# Patient Record
Sex: FEMALE | Race: WHITE | Employment: FULL TIME | URBAN - METROPOLITAN AREA
[De-identification: names, ages, dates, MRNs, and addresses within clinical notes are randomized per-mention and may not be internally consistent; named-entity substitution may affect disease eponyms.]

---

## 2017-12-15 ENCOUNTER — TELEPHONE (OUTPATIENT)
Dept: OBGYN | Facility: OTHER | Age: 22
End: 2017-12-15

## 2017-12-15 NOTE — TELEPHONE ENCOUNTER
12/15/2017    Call Regarding ReattributionPhysical    Attempt 1    Message on voicemail    Comments:       Outreach   Maria Elena Wharton

## 2018-02-22 NOTE — TELEPHONE ENCOUNTER
2/22/2018    Call Regarding Reattribution Physical     Attempt 2    Message on voicemail     Comments:       Outreach   Rosanna Ledesma

## 2018-03-09 NOTE — TELEPHONE ENCOUNTER
3/8/2018    Call Regarding ReattributionPhysical       Attempt 3    Message on voicemail     Comments:       Outreach   SV

## 2018-04-20 ENCOUNTER — HEALTH MAINTENANCE LETTER (OUTPATIENT)
Age: 23
End: 2018-04-20

## 2020-09-08 ENCOUNTER — OFFICE VISIT (OUTPATIENT)
Dept: FAMILY MEDICINE | Facility: CLINIC | Age: 25
End: 2020-09-08
Payer: COMMERCIAL

## 2020-09-08 VITALS
SYSTOLIC BLOOD PRESSURE: 118 MMHG | WEIGHT: 231.44 LBS | RESPIRATION RATE: 16 BRPM | TEMPERATURE: 97.9 F | DIASTOLIC BLOOD PRESSURE: 80 MMHG | HEART RATE: 97 BPM | BODY MASS INDEX: 33.69 KG/M2

## 2020-09-08 DIAGNOSIS — A60.04 HERPES SIMPLEX VIRUS (HSV) INFECTION OF VAGINA: Primary | ICD-10-CM

## 2020-09-08 DIAGNOSIS — Z11.3 SCREEN FOR STD (SEXUALLY TRANSMITTED DISEASE): ICD-10-CM

## 2020-09-08 PROCEDURE — 87389 HIV-1 AG W/HIV-1&-2 AB AG IA: CPT | Performed by: NURSE PRACTITIONER

## 2020-09-08 PROCEDURE — 87591 N.GONORRHOEAE DNA AMP PROB: CPT | Performed by: NURSE PRACTITIONER

## 2020-09-08 PROCEDURE — 99203 OFFICE O/P NEW LOW 30 MIN: CPT | Performed by: NURSE PRACTITIONER

## 2020-09-08 PROCEDURE — 87491 CHLMYD TRACH DNA AMP PROBE: CPT | Performed by: NURSE PRACTITIONER

## 2020-09-08 PROCEDURE — 86695 HERPES SIMPLEX TYPE 1 TEST: CPT | Performed by: NURSE PRACTITIONER

## 2020-09-08 PROCEDURE — 86696 HERPES SIMPLEX TYPE 2 TEST: CPT | Performed by: NURSE PRACTITIONER

## 2020-09-08 PROCEDURE — 36415 COLL VENOUS BLD VENIPUNCTURE: CPT | Performed by: NURSE PRACTITIONER

## 2020-09-08 RX ORDER — VALACYCLOVIR HYDROCHLORIDE 1 G/1
1000 TABLET, FILM COATED ORAL 2 TIMES DAILY
Qty: 20 TABLET | Refills: 0 | Status: SHIPPED | OUTPATIENT
Start: 2020-09-08 | End: 2020-09-22

## 2020-09-08 ASSESSMENT — PAIN SCALES - GENERAL: PAINLEVEL: NO PAIN (1)

## 2020-09-08 NOTE — PATIENT INSTRUCTIONS
Patient Education     What Are Sexually Transmitted Diseases (STDs)?  A sexually transmitted disease (STD) is a disease that is spread during sex. (An STD can also be called STI for sexually transmitted infection.) You can become infected with an STD if you have sex with someone who has an STD. Any sex that involves the penis, vagina, anus, or mouth can spread disease. Some STDs spread through body fluids such as semen, vaginal fluid, or blood. Others spread through contact with affected skin.  Who is at risk?     Places on or in the body where STDs cause damage include reproductive organs, the rectum, and the mouth.   It doesn t matter if you re straight or peña, male or female, young or old. Any person who has sex can get an STD. Your risk increases if:    You have more than one partner. The more partners you have, the greater your risk.    Your partner has other partners. If your partner is exposed to an STD, you could be, too.    You or your partner have had sex with other people in the past. Either of you might be carrying an STD from an earlier partner.    You have an STD. The STD may cause sores or other health problems that increase your risk of new infections. Your risk will stay high unless you change the behaviors that put you at risk of the current infection.  Prevent future problems  Left untreated, certain STDs can lead to cancer or even death. Some can harm unborn babies whose mothers are infected. Others can cause you to not be able to have children (sterility) or can affect changes in behavior or your ability to think. You can prevent these problems with safer sex, regular checkups, and early treatment. Always use a latex condom when you have sex. Get tested if you re at risk. And get treated early if you have an STD.  Getting checked  The only sure way to know if you have an STD is to get checked by a healthcare provider. If you notice a change in how your body looks or feels, have it checked out.  But keep in mind, STDs don t always show symptoms. So if you re at risk of STDs, get checked regularly. If you find you have an STD, be sure your partner gets treatment, too. If not, his or her health is at risk. And left untreated, your partner could pass the STD back to you, or on to others.  Common symptoms  Be alert to any changes in your body and your partner s body. Symptoms may appear in or near the vagina, penis, rectum, mouth, or throat. They include:    Unusual discharge    Lumps, bumps, or rashes    Sores that may be painful, itchy, or painless    Itchy skin    Burning with urination    Pain in the pelvis, belly (abdomen), or rectum  Even if you don t have symptoms  You may have an STD, even if you don t have symptoms. If you think you are at risk, get checked. Go to a clinic or to your healthcare provider. If your partner has an STD, you need to be tested too, even if you feel fine.  Vaccines to prevent disease  Vaccines (also called immunizations) are available to prevent hepatitis A and hepatitis B. These are two kinds of STDs. There is also a vaccine to prevent HPV. This is a virus that can be passed from person to person through sexual contact. Ask your healthcare provider whether any of these vaccines is right for you.   Date Last Reviewed: 11/1/2016 2000-2019 [employer]. [address] [place], [place] [id number]. All rights reserved. This information is not intended as a substitute for professional medical care. Always follow your healthcare professional's instructions.           Patient Education     Living with Herpes  To speed healing, take care of open herpes sores. To reduce outbreaks, take care of your health. And to keep from infecting others, learn how to avoid spreading the virus.     To ease symptoms    Start episodic treatment at the first sign of symptoms, such as itching or tingling.    Take ibuprofen or acetaminophen to limit any pain.    Sit in a warm or cool bath  or use a moist compress to lessen the itching of sores. For some women, genital outbreaks cause burning during urination. In such cases, urinating in a tub of warm water helps reduce burning.    Wear white cotton underwear and loose clothing during outbreaks. Don t wear nylon underwear or tight clothes. They can prevent sores from healing.       To speed healing    Wash sores with mild soap and water. Pat (don't rub) the sores completely dry.    Always wash your hands after touching a sore.    Don t bandage sores. Air helps them heal.    Avoid using any ointment unless it is prescribed. Applying the wrong jelly or cream may hold in moisture and slow healing.    Don t pick at the sores. This can slow healing, and might cause a sore to become infected.    If you wear contacts, wash your hands well before putting them in.       To reduce outbreaks    Eat a balanced diet. Your health care provider may suggest taking supplements. These help ensure that you get all the nutrients you need.    Get plenty of sleep. This helps your immune system work its best.    Limit stress and tension. Both can weaken the body s defenses.    Limit exposure to sun, wind, and extreme heat or cold. Wear sunscreen and lip balm to help prevent outbreaks.       To protect others    Tell your current sex partner and any future partners that you have herpes. If you don t know what to say, ask your healthcare provider for help.    Use a latex condom that covers the affected areas each time you have sex. This reduces the risk of passing herpes to your partner.    Avoid kissing when you have an oral sore.    Do not have intercourse when genital sores are present. Also keep in mind, herpes can be passed during oral sex and with anal contact.    Don t share towels, toothbrushes, lip balm, or lipstick when you have a sore.    If you have very frequent outbreaks, taking daily antiviral medicines can help reduce the likelihood of transmission to your  partner.    Date Last Reviewed: 1/1/2017 2000-2019 [employer]. [address] [place] [address]. All rights reserved. This information is not intended as a substitute for professional medical care. Always follow your healthcare professional's instructions.           Patient Education     The Herpes Virus  Herpes is a virus that can cause sores on the skin. There are 2 types of the virus. Depending on how you come in contact with the virus, either type can cause outbreaks near the mouth or on the sex organs.  Understanding the herpes virus  Herpes reproduces only when it is inside the body. It does so by tricking a healthy cell into producing copies of the herpes virus. Each copy can infect nearby cells. But, before too long, the body s defenses rally to stop the attack. The immune system forces the virus to retreat. Even then, the virus stays inside the body but does not cause disease. For some people, an acute outbreak never happens again. For others, outbreaks are more likely to occur due to menstruation, illness, poor diet, fatigue, exposure to cold or strong sunlight, or stress.    How the herpes virus attacks  1. The herpes virus enters the body through a small break in the skin. The virus can also enter by direct contact with mucous membranes, such as those of the lips, vagina, or anus.  2. Inside the body, the herpes virus binds to a special site on a skin cell. Then part of the virus moves into the cell.  3. Inside the skin cell, the virus releases a set of instructions. These commands cause the cell to begin making copies of the herpes virus.  4. Herpes blisters appear on the skin. Herpes blisters may also appear on mucous membranes lining the mouth, vagina, or anus.    Date Last Reviewed: 1/1/2017 2000-2019 [employer]. [address] [place] [address]. All rights reserved. This information is not intended as a substitute for professional medical care.  Always follow your healthcare professional's instructions.           Patient Education     Herpes: Treatment  Medicines can t cure herpes. But they can help you feel better, and reduce the chances of passing herpes to others. Herpes medicines can control symptoms and shorten the duration of an outbreak (episodic therapy). Some herpes medicines can reduce the number of outbreaks (suppressive therapy). Your healthcare provider will explain your options and any possible side effects.    How the medicines work  Antiviral medicines can prevent the herpes virus from copying itself and help reduce spreading it. Results may vary between people but they are generally helpful if given at the right times and used as directed. Take each medicine exactly as prescribed. Options include:    Primary treatment for the first outbreak. Medicine may be taken for up to 14 days. If needed, it may be taken longer.    Episodic therapy, for infrequent outbreaks. You take medicine for 5-7 days each time you notice symptoms. This can reduce your symptoms and the length of the outbreak. It is important to start the medicine as soon as symptoms of a new outbreak appear.    Suppressive therapy, for frequent outbreaks. This daily medicine can reduce the number of outbreaks you have. In some cases, suppressive therapy prevents all outbreaks and greatly reduces the risk of giving the virus to others.  Types of medicines  There are several types of herpes medicines. Your options depend on how often you have symptoms and how severe they are.    Oral medicines come in pill form. These medicines are most commonly used to treat genital herpes.    Topical medicines come in ointment form. These can be used during outbreaks of oral herpes.    Intravenous (IV) medicines are sometimes used to treat severe herpes in infants, the elderly, or people with weak immune systems.  Date Last Reviewed: 1/1/2017 2000-2019 [employer]. [address] [place] [address]  Road, [place] [address]. All rights reserved. This information is not intended as a substitute for professional medical care. Always follow your healthcare professional's instructions.    Take the antiviral medication for the next 10 days.   Use barrier cream: Desitin over the sores 2 times daily for pain. Take this off with baby oil.   We will have you go to lab today for full STD screen. I will call you with results.

## 2020-09-08 NOTE — PROGRESS NOTES
Subjective     Edgar Rowell is a 25 year old female who presents to clinic today for the following health issues:    Patient had unprotected sex with a new partner about 2 weeks ago. She noticed tenderness and pain in the area of her rectum, perineum, and clitoris over the last couple days, she also notices some itching. She has seen some small blister like lesions but can not see around the rectum. No other acute symptoms. She has recently moved home to care for her mother who is ill, she is under a great deal of stress and is very teary, and feels embarrassed about possibility of STD.     HPI     Patient is also having some white vaginal discharge, pain when touched.   Rash  Onset/Duration: Sat  Description  Location: Butt, vaginal area  Character: Unknown  Itching: severe  Intensity:  severe  Progression of Symptoms:  worsening  Accompanying signs and symptoms:   Fever: no  Body aches or joint pain: no  Sore throat symptoms: no  Recent cold symptoms: tonsil were swollen a few days ago.   History:           Previous episodes of similar rash: None  New exposures:  None and soaps  New body wash   Recent travel: no  Exposure to similar rash: no  Precipitating or alleviating factors: None  Therapies tried and outcome: Neosporin did not help.         Review of Systems   Constitutional, HEENT, cardiovascular, pulmonary, gi and gu systems are negative, except as otherwise noted.      Objective    /80   Pulse 97   Temp 97.9  F (36.6  C) (Temporal)   Resp 16   Wt 105 kg (231 lb 7 oz)   Breastfeeding No   BMI 33.69 kg/m    Body mass index is 33.69 kg/m .  Physical Exam   GENERAL: alert and mild distress  NECK: no asymmetry, masses, or scars  RESP: no wheezes and normal effort.   ABDOMEN: soft, nontender   (female): pustules over the rectum, perineum, and labia, some are unroofed with yellow base. Very painful to exam.   MS: no gross musculoskeletal defects noted, no edema  PSYCH: mentation appears normal,  tearful and anxious            Assessment & Plan     Herpes simplex virus (HSV) infection of vagina  - Herpes lesions over perineum, rectum, and labia. Will get labs to confirm. Will start Valtrex now. I will follow up with her in 2 weeks to assess how she responded to treatment and to do her physical.   - valACYclovir (VALTREX) 1000 mg tablet; Take 1 tablet (1,000 mg) by mouth 2 times daily for 10 days  - Herpes Simplex Virus 1 and 2 IgG    Screen for STD (sexually transmitted disease)  - Will to complete STD screen. Patient verbalized understanding and is in agreement.   - HIV Antigen Antibody Combo  - Herpes Simplex Virus 1 and 2 IgG  - NEISSERIA GONORRHOEA PCR  - CHLAMYDIA TRACHOMATIS PCR    - Patient instructed to call clinic if symptoms get worse over the next week.     - Home care instructions and informational hand outs provided for managing and living with Herpes.     Return in about 2 weeks (around 9/22/2020) for Physical Exam.    Brian Londono NP  [facility]

## 2020-09-09 LAB
C TRACH DNA SPEC QL NAA+PROBE: NEGATIVE
HIV 1+2 AB+HIV1 P24 AG SERPL QL IA: NONREACTIVE
HSV1 IGG SERPL QL IA: 0.3 AI (ref 0–0.8)
HSV2 IGG SERPL QL IA: <0.2 AI (ref 0–0.8)
N GONORRHOEA DNA SPEC QL NAA+PROBE: NEGATIVE
SPECIMEN SOURCE: NORMAL
SPECIMEN SOURCE: NORMAL

## 2020-09-22 ENCOUNTER — OFFICE VISIT (OUTPATIENT)
Dept: FAMILY MEDICINE | Facility: CLINIC | Age: 25
End: 2020-09-22
Payer: COMMERCIAL

## 2020-09-22 VITALS
SYSTOLIC BLOOD PRESSURE: 100 MMHG | DIASTOLIC BLOOD PRESSURE: 64 MMHG | HEIGHT: 70 IN | TEMPERATURE: 98.7 F | BODY MASS INDEX: 32.03 KG/M2 | WEIGHT: 223.7 LBS | RESPIRATION RATE: 16 BRPM

## 2020-09-22 DIAGNOSIS — F41.1 ANXIETY REACTION: ICD-10-CM

## 2020-09-22 DIAGNOSIS — Z12.4 SCREENING FOR CERVICAL CANCER: ICD-10-CM

## 2020-09-22 DIAGNOSIS — A60.04 HERPES SIMPLEX VIRUS (HSV) INFECTION OF VAGINA: Primary | ICD-10-CM

## 2020-09-22 PROCEDURE — 90715 TDAP VACCINE 7 YRS/> IM: CPT | Performed by: NURSE PRACTITIONER

## 2020-09-22 PROCEDURE — G0145 SCR C/V CYTO,THINLAYER,RESCR: HCPCS | Performed by: NURSE PRACTITIONER

## 2020-09-22 PROCEDURE — 90471 IMMUNIZATION ADMIN: CPT | Performed by: NURSE PRACTITIONER

## 2020-09-22 PROCEDURE — 99395 PREV VISIT EST AGE 18-39: CPT | Mod: 25 | Performed by: NURSE PRACTITIONER

## 2020-09-22 RX ORDER — VALACYCLOVIR HYDROCHLORIDE 500 MG/1
500 TABLET, FILM COATED ORAL 2 TIMES DAILY
Qty: 6 TABLET | Refills: 6 | Status: SHIPPED | OUTPATIENT
Start: 2020-09-22 | End: 2021-05-07

## 2020-09-22 ASSESSMENT — MIFFLIN-ST. JEOR: SCORE: 1846.2

## 2020-09-22 NOTE — NURSING NOTE
Prior to immunization administration, verified patients identity using patient s name and date of birth. Please see Immunization Activity for additional information.     Screening Questionnaire for Pediatric Immunization    Is the child sick today?   No   Does the child have allergies to medications, food, a vaccine component, or latex?   No   Has the child had a serious reaction to a vaccine in the past?   No   Does the child have a long-term health problem with lung, heart, kidney or metabolic disease (e.g., diabetes), asthma, a blood disorder, no spleen, complement component deficiency, a cochlear implant, or a spinal fluid leak?  Is he/she on long-term aspirin therapy?   No   If the child to be vaccinated is 2 through 4 years of age, has a healthcare provider told you that the child had wheezing or asthma in the  past 12 months?   No   If your child is a baby, have you ever been told he or she has had intussusception?   No   Has the child, sibling or parent had a seizure, has the child had brain or other nervous system problems?   No   Does the child have cancer, leukemia, AIDS, or any immune system         problem?   No   Does the child have a parent, brother, or sister with an immune system problem?   No   In the past 3 months, has the child taken medications that affect the immune system such as prednisone, other steroids, or anticancer drugs; drugs for the treatment of rheumatoid arthritis, Crohn s disease, or psoriasis; or had radiation treatments?   No   In the past year, has the child received a transfusion of blood or blood products, or been given immune (gamma) globulin or an antiviral drug?   No   Is the child/teen pregnant or is there a chance that she could become       pregnant during the next month?   No   Has the child received any vaccinations in the past 4 weeks?   No      Immunization questionnaire answers were all negative.        [place] given to patient.    Per  orders of Brian Londono CNP, injection of Tdap given by Yumiko Montano CMA. Patient instructed to remain in clinic for 15 minutes afterwards, and to report any adverse reaction to me immediately.    Screening performed by Yumiko Montano CMA on 9/22/2020 at 3:44 PM.

## 2020-09-22 NOTE — PATIENT INSTRUCTIONS
Valtrex refills are in pick one up today.     You will be called with the PAP results.     Tetanus today.    Flu shot in October so you don't get mom sick.     Referral to Behavioral Health: Crystal Alvarenga will call you.     Call me if you need anything.     Brian Londono CNP   Patient Education     Genital Herpes    Genital herpes is a common sexually transmitted infection (STI). It is caused by the herpes simplex virus (HSV). One out of 5 teens and adults carry the herpes virus. During an outbreak, it causes small blisters that break open, leaving small, painful sores in the genital area. Eventually, scabs form and the sores heal. In women, these show up most often on the skin just outside the vaginal opening. They can occur on the buttocks, anus, or cervix. In men, the sores are usually on the tip, sides, or base of the penis. They also occur on the scrotum, buttocks, or thighs.  The first outbreak begins within 2 to 3 weeks after exposure to an infected sexual partner. It may last 1 to 3 weeks. It may cause headache, muscle ache, and fevers. The first outbreak is usually the worst. Because the virus remains in the body even after the sores heal, most people will have more outbreaks. The frequency of outbreaks is different for each person. Some people will never have another outbreak. Others will have several episodes a year. Later outbreaks are usually shorter, milder, and less painful. For many, the number of outbreaks tends to decrease over time. Various factors may trigger an outbreak. These include:    Emotional stress    Menstruation    Presence of another illness (cold, flu, or fever from any cause)    Overexertion and fatigue    Weak immune system  Home care    It is very important that you do not have sexual relations until all the herpes sores have healed completely.    Wash the affected area gently with mild soap and water. Wash your hands after touching the affected area.    You may use  over-the-counter pain medicine unless another pain medicine was prescribed. If you have chronic liver or kidney disease or have ever had a stomach ulcer or gastrointestinal bleeding, talk with your healthcare provider before using these medicines. Also talk to your provider if you are taking medicine to prevent blood clots. Aspirin should never be given to anyone younger than 18 years of age who is ill with a viral infection or fever. It may cause severe liver or brain damage.    Your healthcare provider may prescribe antiviral medicine during the first outbreak. This will help the sores heal faster. Antiviral medicine may also be prescribed so that you have it ready to take at the first sign of another outbreak. This will help the symptoms go away sooner. For people with frequent outbreaks, daily preventive therapy may be prescribed. This will help reduce the frequency of attacks. Daily preventive therapy may also reduce risk of spread of herpes to your sexual partner. Discuss the risks and benefits of daily therapy with your healthcare provider.    If you are a woman who is pregnant now or may become pregnant in the future, let your healthcare provider know that you have had herpes. This may affect the way your baby is delivered.  Preventing spread to others  The virus is spread by sexual contact with someone who has the herpes virus. The risk of spread is highest when the sores are present. However, there is a chance of spreading the virus even when sores are not visible. Inform future sexual partners that you have herpes and that they may become infected. To reduce the risk of passing the virus to a partner who has never had herpes, avoid sexual relations at the first sign of an outbreak and until the sores are fully healed. Latex barriers, such as condoms, reduce the risk of spread between outbreaks if the infected site is covered, but they do not guarantee protection.  Follow-up care  Follow up with your  healthcare provider, or as advised.  People who have just learned that they have herpes may feel upset. Getting the facts about herpes can help you feel more in control. Follow up with your healthcare provider or the public health department for complete STI screening, including HIV testing.  When to seek medical advice  Call your healthcare provider right away if any of these occur:    Inability to urinate due to pain    Swelling or increasing redness in the genital area    Discharge from the vagina or penis    Increasing back or abdominal pain    Rash or joint pain  Call 911  Call 911 or get immediate medical care if any of these occur:    Unusual drowsiness, weakness, or confusion    Worsening headache or stiff neck  Date Last Reviewed: 6/1/2018 2000-2019 The [employer] [address] [place] [address], [place] [address]. All rights reserved. This information is not intended as a substitute for professional medical care. Always follow your healthcare professional's instructions.

## 2020-09-22 NOTE — PROGRESS NOTES
SUBJECTIVE:   CC: Edgar Rowell is an 25 year old woman who presents for preventive health visit.       Patient has been advised of split billing requirements and indicates understanding: Yes  Healthy Habits:    Getting at least 3 servings of Calcium per day:  Yes    Bi-annual eye exam:  NO    Dental care twice a year:  NO    Sleep apnea or symptoms of sleep apnea:  None    Diet:  Regular (no restrictions)    Frequency of exercise:  None    Duration of exercise:  Less than 15 minutes    Taking medications regularly:  Not Applicable    Barriers to taking medications:  Not applicable    Medication side effects:  Not applicable    PHQ-2 Total Score:    Additional concerns today:  No              Today's PHQ-2 Score:   PHQ-2 ( 1999 Pfizer) 9/8/2020   Q1: Little interest or pleasure in doing things 0   Q2: Feeling down, depressed or hopeless 0   PHQ-2 Score 0       Abuse: Current or Past (Physical, Sexual or Emotional) - No  Do you feel safe in your environment? Yes        Social History     Tobacco Use     Smoking status: Never Smoker     Smokeless tobacco: Never Used     Tobacco comment: vaping   Substance Use Topics     Alcohol use: Yes     Comment: social     If you drink alcohol do you typically have >3 drinks per day or >7 drinks per week? No    Alcohol Use 8/13/2015   Prescreen: >3 drinks/day or >7 drinks/week? The patient does not drink >3 drinks per day nor >7 drinks per week.       Reviewed orders with patient.  Reviewed health maintenance and updated orders accordingly - Yes  Labs reviewed in EPIC  BP Readings from Last 3 Encounters:   09/22/20 100/64   09/08/20 118/80   08/13/15 112/62    Wt Readings from Last 3 Encounters:   09/22/20 101.5 kg (223 lb 11.2 oz)   09/08/20 105 kg (231 lb 7 oz)   08/13/15 74.3 kg (163 lb 12 oz) (91 %, Z= 1.33)*     * Growth percentiles are based on CDC (Girls, 2-20 Years) data.                  Patient Active Problem List   Diagnosis     Patellofemoral syndrome, right      Dysmenorrhea     IUD (intrauterine device) in place     Past Surgical History:   Procedure Laterality Date     C NONSPECIFIC PROCEDURE  6/14/2004    Closed reduction with percutaneous pinning, left elbow.       Social History     Tobacco Use     Smoking status: Never Smoker     Smokeless tobacco: Never Used     Tobacco comment: vaping   Substance Use Topics     Alcohol use: Yes     Comment: social     Family History   Problem Relation Age of Onset     Heart Disease Father         heart attack at age 42     Heart Disease Other         MI         Current Outpatient Medications   Medication Sig Dispense Refill     valACYclovir (VALTREX) 500 MG tablet Take 1 tablet (500 mg) by mouth 2 times daily for 3 days 6 tablet 6     levonorgestrel (MIRENA) 20 MCG/24HR IUD 1 each (20 mcg) by Intrauterine route once for 1 dose 1 each 0     Allergies   Allergen Reactions     No Known Drug Allergies        Mammogram not appropriate for this patient based on age.    Pertinent mammograms are reviewed under the imaging tab.  History of abnormal Pap smear: NO - age 21-29 PAP every 3 years recommended     Reviewed and updated as needed this visit by clinical staff  Tobacco  Allergies  Meds  Med Hx  Surg Hx  Fam Hx  Soc Hx        Reviewed and updated as needed this visit by Provider    She is doing better. Herpes has resolved. No other new acute symptoms of concern. She does want to get some help with coping due to mothers terminal illness. She feels anxious and overwhelmed. She does not want medication at this time. Would rather talk with someone. She is not sexually active at this time but wanted information on when safe to be in an intimate relationship.             Review of Systems  CONSTITUTIONAL: NEGATIVE for fever, chills, change in weight  INTEGUMENTARU/SKIN: NEGATIVE for worrisome rashes, moles or lesions  EYES: NEGATIVE for vision changes or irritation  ENT: NEGATIVE for ear, mouth and throat problems  RESP: NEGATIVE for  "significant cough or SOB  BREAST: NEGATIVE for masses, tenderness or discharge  CV: NEGATIVE for chest pain, palpitations or peripheral edema  GI: NEGATIVE for nausea, abdominal pain, heartburn, or change in bowel habits  : NEGATIVE for unusual urinary or vaginal symptoms. Periods are regular.  MUSCULOSKELETAL: NEGATIVE for significant arthralgias or myalgia  NEURO: NEGATIVE for weakness, dizziness or paresthesias  PSYCHIATRIC: Stress and anxiety related to mothers terminal illness with ALS.      OBJECTIVE:   Ht 1.772 m (5' 9.76\")   Wt 101.5 kg (223 lb 11.2 oz)   BMI 32.32 kg/m    Physical Exam  GENERAL: healthy, alert and no distress  EYES: Eyes grossly normal to inspection, PERRL and conjunctivae and sclerae normal  HENT: ear canals and TM's normal, nose and mouth without ulcers or lesions  NECK: no adenopathy, no asymmetry, masses, or scars and thyroid normal to palpation  RESP: lungs clear to auscultation - no rales, rhonchi or wheezes  BREAST: normal without masses, tenderness or nipple discharge and no palpable axillary masses or adenopathy  CV: regular rate and rhythm, normal S1 S2, no S3 or S4, no murmur, click or rub, no peripheral edema and peripheral pulses strong  ABDOMEN: soft, nontender, no hepatosplenomegaly, no masses and bowel sounds normal   (female): normal female external genitalia, normal urethral meatus, vaginal mucosa pink, moist, well rugated, and normal cervix/adnexa/uterus without masses or discharge  MS: no gross musculoskeletal defects noted, no edema  SKIN: no suspicious lesions or rashes  NEURO: Normal strength and tone, mentation intact and speech normal  PSYCH: mentation appears normal, affect normal/bright  LYMPH: no cervical, supraclavicular, axillary, or inguinal adenopathy    Diagnostic Test Results:  Labs reviewed in Epic    ASSESSMENT/PLAN:   1. Herpes simplex virus (HSV) infection of vagina  - Valtrex for recurrent infection. Last infection has totally cleared.   - " "valACYclovir (VALTREX) 500 MG tablet; Take 1 tablet (500 mg) by mouth 2 times daily for 3 days  Dispense: 6 tablet; Refill: 6    2. Anxiety reaction  - Mothers primary care giver, she has ALS life expectancy only a few more months. Would like help discussing coping mechanisms and dealing with grief.   - [facility]    3. Screening for cervical cancer  - Completed today.   - Pap imaged thin layer screen only - recommended age 21 - 24 years    Patient has been advised of split billing requirements and indicates understanding: Yes  COUNSELING:  Reviewed preventive health counseling, as reflected in patient instructions  Special attention given to:        Regular exercise       Healthy diet/nutrition       Contraception       Safe sex practices/STD prevention    Estimated body mass index is 32.32 kg/m  as calculated from the following:    Height as of this encounter: 1.772 m (5' 9.76\").    Weight as of this encounter: 101.5 kg (223 lb 11.2 oz).    Weight management plan: Discussed healthy diet and exercise guidelines    She reports that she has never smoked. She has never used smokeless tobacco.      Counseling Resources:  ATP IV Guidelines  Pooled Cohorts Equation Calculator  Breast Cancer Risk Calculator  BRCA-Related Cancer Risk Assessment: FHS-7 Tool  FRAX Risk Assessment  ICSI Preventive Guidelines  Dietary Guidelines for Americans, 2010  USDA's MyPlate  ASA Prophylaxis  Lung CA Screening    Brian Londono NP  [facility]  "

## 2020-09-23 ENCOUNTER — TELEPHONE (OUTPATIENT)
Dept: BEHAVIORAL HEALTH | Facility: CLINIC | Age: 25
End: 2020-09-23

## 2020-09-23 NOTE — TELEPHONE ENCOUNTER
Phone Encounter   [facility] [place] made attempt to reach patient, by provider request. Unable to leave a message due to mailbox being full. Will send patient a message via [employer].    LONNIE Covington, Behavioral Health Clinician

## 2020-09-24 ENCOUNTER — MYC MEDICAL ADVICE (OUTPATIENT)
Dept: FAMILY MEDICINE | Facility: CLINIC | Age: 25
End: 2020-09-24

## 2020-09-26 LAB
COPATH REPORT: NORMAL
PAP: NORMAL

## 2020-09-26 NOTE — TELEPHONE ENCOUNTER
Patient called.     Reviewed how to deal with her new diagnosis of herpes as well as how to keep herself and her partners safe moving forward.     Information on living with Herpes was provided at her last visit.     Patient will call clinic with any new questions or concerns.    Brian Londono CNP

## 2021-01-09 ENCOUNTER — HEALTH MAINTENANCE LETTER (OUTPATIENT)
Age: 26
End: 2021-01-09

## 2021-05-04 ENCOUNTER — IMMUNIZATION (OUTPATIENT)
Dept: FAMILY MEDICINE | Facility: CLINIC | Age: 26
End: 2021-05-04
Payer: COMMERCIAL

## 2021-05-04 ENCOUNTER — MYC MEDICAL ADVICE (OUTPATIENT)
Dept: FAMILY MEDICINE | Facility: CLINIC | Age: 26
End: 2021-05-04

## 2021-05-04 PROCEDURE — 0011A PR COVID VAC MODERNA 100 MCG/0.5 ML IM: CPT

## 2021-05-04 PROCEDURE — 91301 PR COVID VAC MODERNA 100 MCG/0.5 ML IM: CPT

## 2021-05-06 DIAGNOSIS — A60.04 HERPES SIMPLEX VIRUS (HSV) INFECTION OF VAGINA: ICD-10-CM

## 2021-05-07 RX ORDER — VALACYCLOVIR HYDROCHLORIDE 500 MG/1
TABLET, FILM COATED ORAL
Qty: 6 TABLET | Refills: 6 | Status: SHIPPED | OUTPATIENT
Start: 2021-05-07 | End: 2021-10-26

## 2021-05-07 NOTE — TELEPHONE ENCOUNTER
Routing refill request to provider for review/approval because:  Labs out of range:  Creatinine    JULY RyanN, RN  [facility]

## 2021-05-11 ENCOUNTER — VIRTUAL VISIT (OUTPATIENT)
Dept: FAMILY MEDICINE | Facility: CLINIC | Age: 26
End: 2021-05-11
Payer: COMMERCIAL

## 2021-05-11 DIAGNOSIS — A60.04 HERPES SIMPLEX VIRUS (HSV) INFECTION OF VAGINA: Primary | ICD-10-CM

## 2021-05-11 PROCEDURE — 99214 OFFICE O/P EST MOD 30 MIN: CPT | Mod: 95 | Performed by: NURSE PRACTITIONER

## 2021-05-11 RX ORDER — VALACYCLOVIR HYDROCHLORIDE 500 MG/1
500 TABLET, FILM COATED ORAL DAILY
Qty: 60 TABLET | Refills: 3 | Status: SHIPPED | OUTPATIENT
Start: 2021-05-11 | End: 2021-10-07

## 2021-05-11 NOTE — PROGRESS NOTES
"Edgar Rowell is a 26 year old who is being evaluated via a billable video visit.      How would you like to obtain your AVS? MyChart  If the video visit is dropped, the invitation should be resent by: Text to cell phone: [phone]  Will anyone else be joining your video visit? No    Video Start Time: 2:00 pm    Assessment & Plan     Herpes simplex virus (HSV) infection of vagina  - Has had 3 breaks out in the last few months, due to her increased anxiety over break outs we will start her on daily suppressive therapy. Reviewed dosing with Pharmacy, will start her on 500 mg daily.   - valACYclovir (VALTREX) 500 MG tablet; Take 1 tablet (500 mg) by mouth daily      30  minutes spent on the date of the encounter doing chart review, history and exam, documentation and further activities per the note       BMI:   Estimated body mass index is 32.32 kg/m  as calculated from the following:    Height as of 9/22/20: 1.772 m (5' 9.76\").    Weight as of 9/22/20: 101.5 kg (223 lb 11.2 oz).     See AVS for drug information provided to patient.    Follow-up with myself or primary care provider if no improvement in 4 weeks.     Brian Londono NP  [facility]   Edgar Rowell is a 26 year old who presents for the following health issues     HPI     Patient has some questions about her Valtrex medication.     Frequent vaginal herpes break outs. She is becoming very anxious and has used up all the out break refills, only 2 times did she have lesions. Would like suppressive therapy to help with break outs and for peace of mind. No other acute symptoms. No current break out.     Review of Systems   Constitutional, HEENT, cardiovascular, pulmonary, gi and gu systems are negative, except as otherwise noted.      Objective           Vitals:  No vitals were obtained today due to virtual visit.    Physical Exam   GENERAL: Healthy, alert and no distress  EYES: Eyes grossly normal to inspection.  " No discharge or erythema, or obvious scleral/conjunctival abnormalities.  RESP: No audible wheeze, cough, or visible cyanosis.  No visible retractions or increased work of breathing.    SKIN: Visible skin clear. No significant rash, abnormal pigmentation or lesions.  NEURO: Cranial nerves grossly intact.  Mentation and speech appropriate for age.  PSYCH: Mentation appears normal, affect normal/bright, judgement and insight intact, normal speech and appearance well-groomed.                Video-Visit Details    Type of service:  Video Visit    Video End Time:2:46 PM    Originating Location (pt. Location): Home    Distant Location (provider location):  [facility]     Platform used for Video Visit: [employer]

## 2021-05-11 NOTE — PATIENT INSTRUCTIONS
Patient Education     Valtrex Oral Tablet 500 mg  Uses  For treating viral infections.  Instructions  This medicine may be taken with or without food.  Keep the medicine at room temperature. Avoid heat and direct light.  Drink extra water while on this medicine. Adults should try to drink 6-8 cups (48 to 64 oz.) of water every day.  It is important that you keep taking each dose of this medicine on time even if you are feeling well.  If you forget to take a dose on time, take it as soon as you remember. If it is almost time for the next dose, do not take the missed dose. Return to your normal dosing schedule. Do not take 2 doses of this medicine at one time.  Please tell your doctor and pharmacist about all the medicines you take. Include both prescription and over-the-counter medicines. Also tell them about any vitamins, herbal medicines, or anything else you take for your health.  Cautions  Tell your doctor and pharmacist if you ever had an allergic reaction to a medicine. Symptoms of an allergic reaction can include trouble breathing, skin rash, itching, swelling, or severe dizziness.  Do not use the medication any more than instructed.  Your ability to stay alert or to react quickly may be impaired by this medicine. Do not drive or operate machinery until you know how this medicine will affect you.  Call the doctor if there are any signs of confusion or unusual changes in behavior.  Tell the doctor or pharmacist if you are pregnant, planning to be pregnant, or breastfeeding.  Ask your pharmacist if this medicine can interact with any of your other medicines. Be sure to tell them about all the medicines you take.  Do not start or stop any other medicines without first speaking to your doctor or pharmacist.  Do not share this medicine with anyone who has not been prescribed this medicine.  Side Effects  The following is a list of some common side effects from this medicine. Please speak with your doctor about  what you should do if you experience these or other side effects.    dizziness    headaches    nausea    stomach upset or abdominal pain  A few people may have an allergic reactions to this medicine. Symptoms can include difficulty breathing, skin rash, itching, swelling, or severe dizziness. If you notice any of these symptoms, seek medical help quickly.  Extra  Please speak with your doctor, nurse, or pharmacist if you have any questions about this medicine.  [email or web address]  IMPORTANT NOTE: This document tells you briefly how to take your medicine, but it does not tell you all there is to know about it.Your doctor or pharmacist may give you other documents about your medicine. Please talk to them if you have any questions.Always follow their advice. There is a more complete description of this medicine available in English.Scan this code on your smartphone or tablet or use the web address below. You can also ask your pharmacist for a printout. If you have any questions, please ask your pharmacist.     2021 [employer].

## 2021-05-19 ENCOUNTER — MYC MEDICAL ADVICE (OUTPATIENT)
Dept: FAMILY MEDICINE | Facility: CLINIC | Age: 26
End: 2021-05-19

## 2021-06-01 ENCOUNTER — IMMUNIZATION (OUTPATIENT)
Dept: FAMILY MEDICINE | Facility: CLINIC | Age: 26
End: 2021-06-01
Attending: FAMILY MEDICINE
Payer: COMMERCIAL

## 2021-06-01 PROCEDURE — 0012A PR COVID VAC MODERNA 100 MCG/0.5 ML IM: CPT

## 2021-06-01 PROCEDURE — 91301 PR COVID VAC MODERNA 100 MCG/0.5 ML IM: CPT

## 2021-10-06 NOTE — PROGRESS NOTES
SUBJECTIVE:                                                   Edgar Rowell is a 26 year old female who presents to clinic today for the following health issue(s):  Patient presents with:  Vaginal Problem: Pt states she has been having some vaginal odor going on for the last 2 weeks. No change in discharge.      HPI:  New patient to me here today with concerns of vaginal odor for a few weeks.  She denies any discharge or vaginal itching.  She has a Mirena IUD in place and is amenorrheic on the method.  She had negative STD testing in September and no new partner since then.  She denies any pelvic pain or fever or chills.  She denies any bowel or bladder changes.    No LMP recorded. (Menstrual status: IUD)..     Patient is sexually active, No obstetric history on file..  Using IUD for contraception.    reports that she has never smoked. She has never used smokeless tobacco.    STD testing offered?  Declined    Health maintenance updated:  yes    Today's PHQ-2 Score:   PHQ-2 ( 1999 Pfizer) 5/11/2021   Q1: Little interest or pleasure in doing things 0   Q2: Feeling down, depressed or hopeless 0   PHQ-2 Score 0     Today's PHQ-9 Score: No flowsheet data found.  Today's JAMA-7 Score: No flowsheet data found.    Problem list and histories reviewed & adjusted, as indicated.  Additional history: as documented.    Patient Active Problem List   Diagnosis     Patellofemoral syndrome, right     Dysmenorrhea     IUD (intrauterine device) in place     Past Surgical History:   Procedure Laterality Date     ZZC NONSPECIFIC PROCEDURE  6/14/2004    Closed reduction with percutaneous pinning, left elbow.      Social History     Tobacco Use     Smoking status: Never Smoker     Smokeless tobacco: Never Used     Tobacco comment: vaping   Substance Use Topics     Alcohol use: Yes     Comment: social      Problem (# of Occurrences) Relation (Name,Age of Onset)    ALS (1) Mother    Heart Disease (2) Father: heart attack at age 42, Other  "(uncle): MI            Current Outpatient Medications   Medication Sig     levonorgestrel (MIRENA) 20 MCG/24HR IUD 1 each (20 mcg) by Intrauterine route once for 1 dose     metroNIDAZOLE (FLAGYL) 500 MG tablet Take 1 tablet (500 mg) by mouth 2 times daily for 7 days     valACYclovir (VALTREX) 500 MG tablet TAKE ONE TABLET BY MOUTH TWICE A DAY FOR 3 DAYS     No current facility-administered medications for this visit.     Allergies   Allergen Reactions     No Known Drug Allergies        ROS:  12 point review of systems negative other than symptoms noted below or in the HPI.  Genitourinary: vaginal odor  No urinary frequency or dysuria, bladder or kidney problems, POSITIVE for:, Vaginal odor      OBJECTIVE:     /66   Ht 1.772 m (5' 9.75\")   Wt 106.1 kg (234 lb)   Breastfeeding No   BMI 33.82 kg/m    Body mass index is 33.82 kg/m .    Exam:  Constitutional:  Appearance: Well nourished, well developed alert, in no acute distress  Psychiatric:  Mentation appears normal and affect normal/bright.  Pelvic Exam:  External Genitalia:     Normal appearance for age, moderate amount of thin white discharge present, no tenderness present, no inflammatory lesions present, color normal  Vagina:    Normal vaginal vault without central or paravaginal defects, moderate amount of frothy thin white discharge present, no inflammatory lesions present, no masses present  Urethra:   Urethral Meatus:  No erythema or lesions present  Cervix:     Appearance healthy, no lesions present, nontender to palpation, no bleeding present, string present  Perineum:     Perineum within normal limits, no evidence of trauma, no rashes or skin lesions present  Anus:     Anus within normal limits, no hemorrhoids present  Inguinal Lymph Nodes:     No lymphadenopathy present  Pubic Hair:     Normal pubic hair distribution for age  Genitalia and Groin:     No rashes present, no lesions present, no areas of discoloration, no masses present   "     In-Clinic Test Results:  Results for orders placed or performed in visit on 10/07/21 (from the past 24 hour(s))   Wet preparation    Specimen: Vagina; Swab   Result Value Ref Range    Trichomonas Absent Absent    Yeast Absent Absent    Clue Cells Present (A) Absent    WBCs/high power field 2+ (A) None       ASSESSMENT/PLAN:                                                        ICD-10-CM    1. Vaginal odor  N89.8 Wet preparation     metroNIDAZOLE (FLAGYL) 500 MG tablet   2. Screen for STD (sexually transmitted disease)  Z11.3 Neisseria gonorrhoeae PCR   3. Screening for chlamydial disease  Z11.8 Chlamydia trachomatis PCR   4. IUD (intrauterine device) in place  Z97.5        There are no Patient Instructions on file for this visit.    26-year-old female with 2 weeks worth of vaginal odor.  Her exam shows moderate amount of abnormal vaginal discharge and an odor.  Wet prep: STD testing was collected and we will update her with results.  We encouraged her to sit in a warm bathtub and soak for 5 minutes for the next few days and on a weekly basis to prevent infection.    Wet prep + clue cells. Will treat with oral flagyl    ALEKSEY Huang CNP  [facility] [place]

## 2021-10-07 ENCOUNTER — OFFICE VISIT (OUTPATIENT)
Dept: OBGYN | Facility: CLINIC | Age: 26
End: 2021-10-07
Payer: COMMERCIAL

## 2021-10-07 VITALS
WEIGHT: 234 LBS | BODY MASS INDEX: 33.5 KG/M2 | DIASTOLIC BLOOD PRESSURE: 66 MMHG | HEIGHT: 70 IN | SYSTOLIC BLOOD PRESSURE: 118 MMHG

## 2021-10-07 DIAGNOSIS — Z11.8 SCREENING FOR CHLAMYDIAL DISEASE: ICD-10-CM

## 2021-10-07 DIAGNOSIS — Z11.3 SCREEN FOR STD (SEXUALLY TRANSMITTED DISEASE): ICD-10-CM

## 2021-10-07 DIAGNOSIS — Z97.5 IUD (INTRAUTERINE DEVICE) IN PLACE: ICD-10-CM

## 2021-10-07 DIAGNOSIS — N89.8 VAGINAL ODOR: Primary | ICD-10-CM

## 2021-10-07 LAB
CLUE CELLS: PRESENT
TRICHOMONAS, WET PREP: ABNORMAL
WBC'S/HIGH POWER FIELD, WET PREP: ABNORMAL
YEAST, WET PREP: ABNORMAL

## 2021-10-07 PROCEDURE — 87591 N.GONORRHOEAE DNA AMP PROB: CPT | Performed by: NURSE PRACTITIONER

## 2021-10-07 PROCEDURE — 87491 CHLMYD TRACH DNA AMP PROBE: CPT | Performed by: NURSE PRACTITIONER

## 2021-10-07 PROCEDURE — 99203 OFFICE O/P NEW LOW 30 MIN: CPT | Performed by: NURSE PRACTITIONER

## 2021-10-07 PROCEDURE — 87210 SMEAR WET MOUNT SALINE/INK: CPT | Performed by: NURSE PRACTITIONER

## 2021-10-07 RX ORDER — METRONIDAZOLE 500 MG/1
500 TABLET ORAL 2 TIMES DAILY
Qty: 14 TABLET | Refills: 0 | Status: SHIPPED | OUTPATIENT
Start: 2021-10-07 | End: 2021-10-14

## 2021-10-07 ASSESSMENT — MIFFLIN-ST. JEOR: SCORE: 1887.7

## 2021-10-08 LAB
C TRACH DNA SPEC QL NAA+PROBE: NEGATIVE
N GONORRHOEA DNA SPEC QL NAA+PROBE: NEGATIVE

## 2021-10-23 ENCOUNTER — HEALTH MAINTENANCE LETTER (OUTPATIENT)
Age: 26
End: 2021-10-23

## 2021-10-26 ENCOUNTER — TELEPHONE (OUTPATIENT)
Dept: OBGYN | Facility: CLINIC | Age: 26
End: 2021-10-26

## 2021-10-26 DIAGNOSIS — A60.04 HERPES SIMPLEX VIRUS (HSV) INFECTION OF VAGINA: ICD-10-CM

## 2021-10-26 RX ORDER — VALACYCLOVIR HYDROCHLORIDE 500 MG/1
500 TABLET, FILM COATED ORAL 2 TIMES DAILY
Qty: 6 TABLET | Refills: 6 | Status: SHIPPED | OUTPATIENT
Start: 2021-10-26 | End: 2021-12-02

## 2021-10-26 NOTE — TELEPHONE ENCOUNTER
Pt called into clinic regarding refill request for valACYclovir (VALTREX) 500 MG tablet.   valACYclovir (VALTREX) 500 MG tablet 6 tablet 6 5/7/2021  No   Sig: TAKE ONE TABLET BY MOUTH TWICE A DAY FOR 3 DAYS   Sent to pharmacy as: valACYclovir HCl 500 MG Oral Tablet (VALTREX)   Class: E-Prescribe   Order: [id number]   E-Prescribing Status: Receipt confirmed by pharmacy (5/7/2021  9:00 AM CDT)       Pt previously established care with [facility], per VV encounter on 5/11/21: Herpes simplex virus (HSV) infection of vagina  - Has had 3 breaks out in the last few months, due to her increased anxiety over break outs we will start her on daily suppressive therapy. Reviewed dosing with Pharmacy, will start her on 500 mg daily.   - valACYclovir (VALTREX) 500 MG tablet; Take 1 tablet (500 mg) by mouth daily     This medication change was not ordered per pt chart records. Pt calling in to see if this Rx could be placed. Routing to [facility] as last OB/GYN pt has seen. Per pt, provider [facility] is no longer with [facility] [place]. Please advise.     Dominik LOAIZA RN

## 2021-12-02 ENCOUNTER — OFFICE VISIT (OUTPATIENT)
Dept: OBGYN | Facility: CLINIC | Age: 26
End: 2021-12-02
Payer: COMMERCIAL

## 2021-12-02 VITALS
BODY MASS INDEX: 33.9 KG/M2 | HEART RATE: 68 BPM | WEIGHT: 236.8 LBS | HEIGHT: 70 IN | SYSTOLIC BLOOD PRESSURE: 114 MMHG | DIASTOLIC BLOOD PRESSURE: 62 MMHG

## 2021-12-02 DIAGNOSIS — A60.04 HERPES SIMPLEX VIRUS (HSV) INFECTION OF VAGINA: ICD-10-CM

## 2021-12-02 PROCEDURE — 99213 OFFICE O/P EST LOW 20 MIN: CPT | Performed by: NURSE PRACTITIONER

## 2021-12-02 RX ORDER — SPIRONOLACTONE 50 MG/1
50 TABLET, FILM COATED ORAL
COMMUNITY
Start: 2021-10-27 | End: 2023-07-19

## 2021-12-02 RX ORDER — VALACYCLOVIR HYDROCHLORIDE 500 MG/1
500 TABLET, FILM COATED ORAL DAILY
Qty: 90 TABLET | Refills: 2 | Status: SHIPPED | OUTPATIENT
Start: 2021-12-02 | End: 2023-01-30

## 2021-12-02 ASSESSMENT — MIFFLIN-ST. JEOR: SCORE: 1900.4

## 2021-12-02 NOTE — PROGRESS NOTES
SUBJECTIVE:                                                   Edgar Rowell is a 26 year old female who presents to clinic today for the following health issue(s):  Patient presents with:  Medication Question: Discuss changing valacyclovir med to a daily dosage, c/o no symptoms/break outs; just wants to start taking daily      HPI:  Patient here today requesting to change her Valtrex prescription to daily use.  Her last outbreak was in January of this year.    No LMP recorded. (Menstrual status: IUD)..     Patient is sexually active, No obstetric history on file..  Using IUD and condoms for contraception.    reports that she has never smoked. She has never used smokeless tobacco.    STD testing offered?  Declined    Health maintenance updated:  yes    Today's PHQ-2 Score:   PHQ-2 ( 1999 Pfizer) 5/11/2021   Q1: Little interest or pleasure in doing things 0   Q2: Feeling down, depressed or hopeless 0   PHQ-2 Score 0   PHQ-2 Total Score (12-17 Years)- Positive if 3 or more points; Administer PHQ-A if positive 0     Problem list and histories reviewed & adjusted, as indicated.  Additional history: as documented.    Patient Active Problem List   Diagnosis     Patellofemoral syndrome, right     Dysmenorrhea     IUD (intrauterine device) in place     Past Surgical History:   Procedure Laterality Date     ZZC NONSPECIFIC PROCEDURE  6/14/2004    Closed reduction with percutaneous pinning, left elbow.      Social History     Tobacco Use     Smoking status: Never Smoker     Smokeless tobacco: Never Used     Tobacco comment: vaping   Substance Use Topics     Alcohol use: Yes     Comment: social      Problem (# of Occurrences) Relation (Name,Age of Onset)    ALS (1) Mother    Heart Disease (2) Father: heart attack at age 42, Other (uncle): MI    No Known Problems (6) Sister, Brother, Maternal Grandmother, Maternal Grandfather, Paternal Grandmother, Paternal Grandfather            Current Outpatient Medications  "  Medication Sig     levonorgestrel (MIRENA) 20 MCG/24HR IUD 1 each (20 mcg) by Intrauterine route once for 1 dose     spironolactone (ALDACTONE) 50 MG tablet Take 50 mg by mouth     valACYclovir (VALTREX) 500 MG tablet Take 1 tablet (500 mg) by mouth daily     No current facility-administered medications for this visit.     Allergies   Allergen Reactions     No Known Drug Allergies        ROS:  12 point review of systems negative other than symptoms noted below or in the HPI.  No urinary frequency or dysuria, bladder or kidney problems      OBJECTIVE:     /62   Pulse 68   Ht 1.772 m (5' 9.75\")   Wt 107.4 kg (236 lb 12.8 oz)   BMI 34.22 kg/m    Body mass index is 34.22 kg/m .    Exam:  Constitutional:  Appearance: Well nourished, well developed alert, in no acute distress  Psychiatric:  Mentation appears normal and affect normal/bright.     In-Clinic Test Results:  No results found for this or any previous visit (from the past 24 hour(s)).    ASSESSMENT/PLAN:                                                        ICD-10-CM    1. Herpes simplex virus (HSV) infection of vagina  A60.04 valACYclovir (VALTREX) 500 MG tablet       There are no Patient Instructions on file for this visit.    26-year-old female with a past history of herpes simplex.  We will change her prescription to daily use and have her double up if she would have an outbreak.  She is due for her annual exam and we have asked to see her back this summer for her annual.  She had a normal Pap smear in 2020.    ALEKSEY Huang CNP  [facility] [place]  "

## 2022-02-08 ENCOUNTER — OFFICE VISIT (OUTPATIENT)
Dept: OBGYN | Facility: CLINIC | Age: 27
End: 2022-02-08
Payer: COMMERCIAL

## 2022-02-08 VITALS
DIASTOLIC BLOOD PRESSURE: 80 MMHG | WEIGHT: 244 LBS | HEART RATE: 77 BPM | OXYGEN SATURATION: 100 % | BODY MASS INDEX: 34.93 KG/M2 | SYSTOLIC BLOOD PRESSURE: 123 MMHG | HEIGHT: 70 IN

## 2022-02-08 DIAGNOSIS — N89.8 VAGINAL DISCHARGE: Primary | ICD-10-CM

## 2022-02-08 LAB
CLUE CELLS: ABNORMAL
TRICHOMONAS, WET PREP: ABNORMAL
WBC'S/HIGH POWER FIELD, WET PREP: ABNORMAL
YEAST, WET PREP: ABNORMAL

## 2022-02-08 PROCEDURE — 99213 OFFICE O/P EST LOW 20 MIN: CPT | Performed by: NURSE PRACTITIONER

## 2022-02-08 PROCEDURE — 87491 CHLMYD TRACH DNA AMP PROBE: CPT | Performed by: NURSE PRACTITIONER

## 2022-02-08 PROCEDURE — 87210 SMEAR WET MOUNT SALINE/INK: CPT | Performed by: NURSE PRACTITIONER

## 2022-02-08 PROCEDURE — 87591 N.GONORRHOEAE DNA AMP PROB: CPT | Performed by: NURSE PRACTITIONER

## 2022-02-08 ASSESSMENT — PAIN SCALES - GENERAL: PAINLEVEL: NO PAIN (0)

## 2022-02-08 ASSESSMENT — MIFFLIN-ST. JEOR: SCORE: 1933.06

## 2022-02-08 NOTE — PROGRESS NOTES
"  Assessment & Plan     Vaginal discharge  Await remaining results and treat if indicated. Discussed possibility that the Monistat may still be working, also may be interfering with test. If remaining results negative and symptoms persist beyond the next few days, patient to send message. Reviewed home care relief measures she can try.  - Wet preparation  - Chlamydia trachomatis PCR  - Neisseria gonorrhoeae PCR    ALEKSEY Alegria CNP  [facility]   Edgar is a 26 year old who presents for the following health issues     HPI     Vaginal Symptoms  Onset/Duration: 02/06/2022  Description:  Vaginal Discharge: white   Itching (Pruritis): YES  Burning sensation:  YES  Odor: no  Accompanying Signs & Symptoms:  Urinary symptoms: no  Abdominal pain: no  Fever: no  History:   Sexually active: YES  New Partner: YES  Possibility of Pregnancy:  no  Recent antibiotic use: no  Previous vaginitis issues: no  Precipitating or alleviating factors: None  Therapies tried and outcome: Monistat-burning    Symptoms began 2 days ago and last night she used a 1 day OTC Monistat. [place] an increase in burning after use, but maybe some slight improvement in symptoms. Continues to have thick white discharge and itching. Denies odor, urinary symptoms, pelvic pain, fever, nausea. Using IUD for contraception. No specific STI concern, but would like gonorrhea and chlamydia screening today.    Review of Systems   Constitutional, HEENT, cardiovascular, pulmonary, gi and gu systems are negative, except as otherwise noted.      Objective    /80 (BP Location: Right arm, Patient Position: Sitting, Cuff Size: Adult Large)   Pulse 77   Ht 1.772 m (5' 9.75\")   Wt 110.7 kg (244 lb)   SpO2 100%   BMI 35.26 kg/m    Body mass index is 35.26 kg/m .  Physical Exam   GENERAL: healthy, alert and no distress   (female): normal female external genitalia, normal urethral meatus , vaginal mucosa pink, moist, well " rugated and vaginal discharge - moderate, white and thick (Monistat?)  MS: no gross musculoskeletal defects noted, no edema  SKIN: no suspicious lesions or rashes  PSYCH: mentation appears normal, affect normal/bright    Results for orders placed or performed in visit on 02/08/22 (from the past 24 hour(s))   Wet preparation    Specimen: Vagina; Swab   Result Value Ref Range    Trichomonas Absent Absent    Yeast Absent Absent    Clue Cells Absent Absent    WBCs/high power field 1+ (A) None

## 2022-02-09 LAB
C TRACH DNA SPEC QL NAA+PROBE: NEGATIVE
N GONORRHOEA DNA SPEC QL NAA+PROBE: NEGATIVE

## 2022-02-10 ENCOUNTER — MYC MEDICAL ADVICE (OUTPATIENT)
Dept: OBGYN | Facility: CLINIC | Age: 27
End: 2022-02-10
Payer: COMMERCIAL

## 2022-02-10 DIAGNOSIS — N76.0 VAGINITIS AND VULVOVAGINITIS: Primary | ICD-10-CM

## 2022-02-10 NOTE — TELEPHONE ENCOUNTER
Pt last seen 2/8/2022 for yeast infection concerns. Wet prep on 2/8 was normal. No relief with use of monistat.    Pt having increased vaginal discharge, burning and itching still persisting.    Pt requesting prescription to help with symptoms, preferred pharmacy is [facility].    RN routing to provider for advisement on lab only appt again or other recommendations.    Sophie Combs RN on 2/10/2022 at 4:01 PM

## 2022-02-11 RX ORDER — FLUCONAZOLE 150 MG/1
150 TABLET ORAL ONCE
Qty: 1 TABLET | Refills: 0 | Status: SHIPPED | OUTPATIENT
Start: 2022-02-11 | End: 2022-02-11

## 2022-08-09 ENCOUNTER — OFFICE VISIT (OUTPATIENT)
Dept: OBGYN | Facility: CLINIC | Age: 27
End: 2022-08-09
Payer: COMMERCIAL

## 2022-08-09 VITALS
BODY MASS INDEX: 33.5 KG/M2 | WEIGHT: 234 LBS | SYSTOLIC BLOOD PRESSURE: 120 MMHG | HEIGHT: 70 IN | DIASTOLIC BLOOD PRESSURE: 72 MMHG

## 2022-08-09 DIAGNOSIS — Z11.3 SCREEN FOR STD (SEXUALLY TRANSMITTED DISEASE): ICD-10-CM

## 2022-08-09 DIAGNOSIS — N89.8 VAGINAL DISCHARGE: Primary | ICD-10-CM

## 2022-08-09 DIAGNOSIS — Z11.8 SCREENING FOR CHLAMYDIAL DISEASE: ICD-10-CM

## 2022-08-09 LAB
CLUE CELLS: ABNORMAL
CLUE CELLS: ABNORMAL
NUGENT SCORE: 8
TRICHOMONAS, WET PREP: ABNORMAL
WBC'S/HIGH POWER FIELD, WET PREP: ABNORMAL
WHITE BLOOD CELLS: ABNORMAL
YEAST, WET PREP: ABNORMAL

## 2022-08-09 PROCEDURE — 87102 FUNGUS ISOLATION CULTURE: CPT | Performed by: NURSE PRACTITIONER

## 2022-08-09 PROCEDURE — 87491 CHLMYD TRACH DNA AMP PROBE: CPT | Performed by: NURSE PRACTITIONER

## 2022-08-09 PROCEDURE — 87389 HIV-1 AG W/HIV-1&-2 AB AG IA: CPT | Performed by: NURSE PRACTITIONER

## 2022-08-09 PROCEDURE — 87591 N.GONORRHOEAE DNA AMP PROB: CPT | Performed by: NURSE PRACTITIONER

## 2022-08-09 PROCEDURE — 86780 TREPONEMA PALLIDUM: CPT | Performed by: NURSE PRACTITIONER

## 2022-08-09 PROCEDURE — 36415 COLL VENOUS BLD VENIPUNCTURE: CPT | Performed by: NURSE PRACTITIONER

## 2022-08-09 PROCEDURE — 87106 FUNGI IDENTIFICATION YEAST: CPT | Performed by: NURSE PRACTITIONER

## 2022-08-09 PROCEDURE — 87205 SMEAR GRAM STAIN: CPT | Performed by: NURSE PRACTITIONER

## 2022-08-09 PROCEDURE — 87210 SMEAR WET MOUNT SALINE/INK: CPT | Performed by: NURSE PRACTITIONER

## 2022-08-09 PROCEDURE — 99213 OFFICE O/P EST LOW 20 MIN: CPT | Performed by: NURSE PRACTITIONER

## 2022-08-09 NOTE — PROGRESS NOTES
SUBJECTIVE:                                                   Edgar Rowell is a 27 year old female who presents to clinic today for the following health issue(s):  Patient presents with:  STD: screening  Vaginal Problem: C/o vaginal discomfort and increased thick vaginal discharge. Pt took one day of monistat and feels symptoms have improved. Still has lingering discomfort and would like testing done.    HPI:  27-year-old female with complaints of vaginal discomfort and thicker vaginal discharge.  She purchased a Monistat 3-day but only completed 1 day of the treatment.  She states that her symptoms improved for about 24 hours.  She has some lingering discomfort and is requesting STD testing.  She has a IUD in place and is amenorrheic on the method.  She denies any urinary symptoms.    No LMP recorded. (Menstrual status: IUD).     Patient is sexually active, .  Using IUD and condoms for contraception.    reports that she has never smoked. She has never used smokeless tobacco.    STD testing offered?  Accepted    Health maintenance updated:  yes    Today's PHQ-2 Score:   PHQ-2 (  [employer]) 2022   Q1: Little interest or pleasure in doing things 0   Q2: Feeling down, depressed or hopeless 0   PHQ-2 Score 0   PHQ-2 Total Score (12-17 Years)- Positive if 3 or more points; Administer PHQ-A if positive -     Today's PHQ-9 Score: No flowsheet data found.  Today's JAMA-7 Score: No flowsheet data found.    Problem list and histories reviewed & adjusted, as indicated.  Additional history: as documented.    Patient Active Problem List   Diagnosis     Patellofemoral syndrome, right     Dysmenorrhea     IUD (intrauterine device) in place     Past Surgical History:   Procedure Laterality Date     [facility] NONSPECIFIC PROCEDURE  2004    Closed reduction with percutaneous pinning, left elbow.      Social History     Tobacco Use     Smoking status: Never Smoker     Smokeless tobacco: Never Used     Tobacco comment:  "vaping   Substance Use Topics     Alcohol use: Yes     Comment: social      Problem (# of Occurrences) Relation (Name,Age of Onset)    ALS (1) Mother    Heart Disease (2) Father: heart attack at age 42, Other (uncle): MI    No Known Problems (6) Sister, Brother, Maternal Grandmother, Maternal Grandfather, Paternal Grandmother, Paternal Grandfather            Current Outpatient Medications   Medication Sig     levonorgestrel (MIRENA) 20 MCG/24HR IUD 1 each (20 mcg) by Intrauterine route once for 1 dose     spironolactone (ALDACTONE) 50 MG tablet Take 50 mg by mouth     valACYclovir (VALTREX) 500 MG tablet Take 1 tablet (500 mg) by mouth daily     No current facility-administered medications for this visit.     Allergies   Allergen Reactions     No Known Drug Allergies        ROS:   12 point review of systems negative other than symptoms noted below or in the HPI.  Genitourinary: Vaginal Discharge  No urinary frequency or dysuria, bladder or kidney problems, POSITIVE for:, vaginal discharge, vaginal itching or burning      OBJECTIVE:     /72   Ht 1.772 m (5' 9.75\")   Wt 106.1 kg (234 lb)   BMI 33.82 kg/m    Body mass index is 33.82 kg/m .    Exam:  Constitutional:  Appearance: Well nourished, well developed alert, in no acute distress  Psychiatric:  Mentation appears normal and affect normal/bright.  Pelvic Exam:  External Genitalia:     Normal appearance for age, no discharge present, no tenderness present, no inflammatory lesions present, color normal  Vagina:    Normal vaginal vault without central or paravaginal defects, no discharge present, no inflammatory lesions present, no masses present  Urethra:   Urethral Meatus:  No erythema or lesions present  Cervix:     Appearance healthy, no lesions present, nontender to palpation, no bleeding present, string present  Perineum:     Perineum within normal limits, no evidence of trauma, no rashes or skin lesions present  Anus:     Anus within normal limits, no " hemorrhoids present  Inguinal Lymph Nodes:     No lymphadenopathy present  Pubic Hair:     Normal pubic hair distribution for age  Genitalia and Groin:     No rashes present, no lesions present, no areas of discoloration, no masses present       In-Clinic Test Results:  Results for orders placed or performed in visit on 08/09/22 (from the past 24 hour(s))   Wet prep - Clinic Collect    Specimen: Vagina; Swab   Result Value Ref Range    Trichomonas Absent Absent    Yeast Absent Absent    Clue Cells Absent Absent    WBCs/high power field 2+ (A) None   Bacterial Vaginosis Smear    Specimen: Vagina; Swab    Narrative    The following orders were created for panel order Bacterial Vaginosis Smear.  Procedure                               Abnormality         Status                     ---------                               -----------         ------                     Bacterial Vaginosis Stain[[id number]]                                                     Please view results for these tests on the individual orders.       ASSESSMENT/PLAN:                                                        ICD-10-CM    1. Vaginal discharge  N89.8 Wet prep - Clinic Collect     Yeast culture     Bacterial Vaginosis Smear   2. Screen for STD (sexually transmitted disease)  Z11.3 NEISSERIA GONORRHOEA PCR     Treponema Abs w Reflex to RPR and Titer     HIV Antigen Antibody Combo     Treponema Abs w Reflex to RPR and Titer     HIV Antigen Antibody Combo   3. Screening for chlamydial disease  Z11.8 CHLAMYDIA TRACHOMATIS PCR       There are no Patient Instructions on file for this visit.    27-year-old female with a normal exam.  Wet prep is negative.  We will send for STD testing and vaginal cultures.    ALEKSEY Huang CNP  [facility] [place]

## 2022-08-10 LAB
C TRACH DNA SPEC QL NAA+PROBE: NEGATIVE
HIV 1+2 AB+HIV1 P24 AG SERPL QL IA: NONREACTIVE
N GONORRHOEA DNA SPEC QL NAA+PROBE: NEGATIVE
T PALLIDUM AB SER QL: NONREACTIVE

## 2022-08-10 RX ORDER — CLINDAMYCIN PHOSPHATE 20 MG/G
1 CREAM VAGINAL AT BEDTIME
Qty: 40 G | Refills: 0 | Status: SHIPPED | OUTPATIENT
Start: 2022-08-10 | End: 2022-08-17

## 2022-08-11 RX ORDER — FLUCONAZOLE 150 MG/1
150 TABLET ORAL ONCE
Qty: 1 TABLET | Refills: 0 | Status: SHIPPED | OUTPATIENT
Start: 2022-08-11 | End: 2022-08-11

## 2022-08-12 LAB — BACTERIA SPEC CULT: ABNORMAL

## 2022-10-09 ENCOUNTER — HEALTH MAINTENANCE LETTER (OUTPATIENT)
Age: 27
End: 2022-10-09

## 2022-11-26 ENCOUNTER — HEALTH MAINTENANCE LETTER (OUTPATIENT)
Age: 27
End: 2022-11-26

## 2023-01-29 DIAGNOSIS — A60.04 HERPES SIMPLEX VIRUS (HSV) INFECTION OF VAGINA: ICD-10-CM

## 2023-01-30 RX ORDER — VALACYCLOVIR HYDROCHLORIDE 500 MG/1
TABLET, FILM COATED ORAL
Qty: 90 TABLET | Refills: 1 | Status: SHIPPED | OUTPATIENT
Start: 2023-01-30 | End: 2023-06-26

## 2023-01-30 NOTE — TELEPHONE ENCOUNTER
"Requested Prescriptions   Pending Prescriptions Disp Refills     valACYclovir (VALTREX) 500 MG tablet [Pharmacy Med Name: VALACYCLOVIR 500MG TABLETS] 90 tablet 2     Sig: TAKE 1 TABLET(500 MG) BY MOUTH DAILY       Antivirals for Herpes Protocol Failed - 1/29/2023 10:51 AM        Failed - Normal serum creatinine on file in past 12 months     No lab results found.    Ok to refill medication if creatinine is low          Passed - Patient is age 12 or older        Passed - Recent (12 mo) or future (30 days) visit within the authorizing provider's specialty     Patient has had an office visit with the authorizing provider or a provider within the authorizing providers department within the previous 12 mos or has a future within next 30 days. See \"Patient Info\" tab in inbasket, or \"Choose Columns\" in Meds & Orders section of the refill encounter.              Passed - Medication is active on med list           Last Written Prescription Date:  12/2/21  Last Fill Quantity: 90,  # refills: 2   Last office visit: 8/9/2022 with prescribing provider:  Brie Frost   Future Office Visit:      Prescription approved per [facility] Refill Protocol.  Hemalatha Yuan RN on 1/30/2023 at 1:44 PM          "

## 2023-03-27 NOTE — PROGRESS NOTES
SUBJECTIVE:                                                   Edgar Rowell is a 28 year old who presents to clinic today for the following health issue(s):  Patient presents with:  Vaginal Problem: Full panel STD, Itchy, Monostat otc      HPI:  Edgar presents today for possible vaginitis.  She states she she has what feels like a yeast infection for the past 4 days.  She used OTC Monistat 3 with some relief.  Would like STI screening and testing for other vaginal infection.     No LMP recorded (lmp unknown). (Menstrual status: IUD).  Menstrual History: frequency: every 21-35 days  Patient is sexually active  .  Using IUD for contraception.   Health maintenance updated:  No  Overdue          Never   Done NICOTINE/TOBACCO CESSATION COUNSELING Q 1 YR (Yearly)     Never   Done ADVANCE CARE PLANNING (Every 5 Years)     MAR 18   1998 HEPATITIS B IMMUNIZATION (2 of 3 - 3-dose series)   Last completed: 1998     Never   Done HEPATITIS C SCREENING (Once)     SEP 10   2015 HPV IMMUNIZATION (2 - 3-dose series)  Last completed: Aug 13, 2015     JUL 27   2021 COVID-19 Vaccine (3 - Booster for Moderna series)  Last completed: 2021     SEP 22   2021 YEARLY PREVENTIVE VISIT (Yearly)  Last completed: Sep 22, 2020     SEP 1   2022 INFLUENZA VACCINE (1)  Last completed:  PHQ-2 (once per calendar year) (Yearly, January to December)  Last completed: Aug 9, 2022        Due Soon          SEP 22   2023 PAP (Every 3 Years)  Last completed: Sep 22, 2020        Upcoming          SEP 22   2030 DTAP/TDAP/TD IMMUNIZATION (8 - Td or Tdap)  Last completed: Sep 22, 2020       STI infx testing offered:  Accepted    Last PHQ-9 score on record =   PHQ-9 SCORE 3/29/2023   PHQ-9 Total Score 2     Last GAD7 score on record =   JAMA-7 SCORE 3/29/2023   Total Score 0     Problem list and histories reviewed & adjusted, as indicated.  Additional history: as documented.    Patient Active Problem List  "  Diagnosis     Patellofemoral syndrome, right     Dysmenorrhea     IUD (intrauterine device) in place     Past Surgical History:   Procedure Laterality Date     [facility] NONSPECIFIC PROCEDURE  6/14/2004    Closed reduction with percutaneous pinning, left elbow.      Social History     Tobacco Use     Smoking status: Never     Smokeless tobacco: Never     Tobacco comments:     vaping   Substance Use Topics     Alcohol use: Yes     Comment: social      Problem (# of Occurrences) Relation (Name,Age of Onset)    Heart Disease (2) Father: heart attack at age 42, Other (uncle): MI    ALS (1) Mother    No Known Problems (6) Sister, Brother, Maternal Grandmother, Maternal Grandfather, Paternal Grandmother, Paternal Grandfather            Current Outpatient Medications   Medication Sig     levonorgestrel (MIRENA) 20 MCG/24HR IUD 1 each (20 mcg) by Intrauterine route once for 1 dose     valACYclovir (VALTREX) 500 MG tablet TAKE 1 TABLET(500 MG) BY MOUTH DAILY     spironolactone (ALDACTONE) 50 MG tablet Take 50 mg by mouth     No current facility-administered medications for this visit.     Allergies   Allergen Reactions     No Known Drug Allergies        ROS:  12 point review of systems negative other than symptoms noted below or in the HPI.  Genitourinary: Vaginal Discharge and Vaginal Itching  12 point review of systems negative other than symptoms noted below.    OBJECTIVE:     /76   Ht 1.778 m (5' 10\")   Wt 100.4 kg (221 lb 6.4 oz)   LMP  (LMP Unknown)   BMI 31.77 kg/m    Body mass index is 31.77 kg/m .    PHYSICAL EXAM:  Constitutional:  Appearance: Well nourished, well developed alert, in no acute distress  Neurologic:  Mental Status:  Oriented X3.  Normal strength and tone, sensory exam grossly normal, mentation intact and speech normal.    Psychiatric:  Mentation appears normal and affect normal/bright.  Pelvic Exam:  External Genitalia:     Normal appearance for age, no discharge present, no tenderness present, " no inflammatory lesions present, color normal  Vagina:    Normal vaginal vault without central or paravaginal defects, no discharge present, no inflammatory lesions present, no masses present  Bladder:     Nontender to palpation  Urethra:   Urethral Body:  Urethra palpation normal, urethra structural support normal   Urethral Meatus:  No erythema or lesions present  Cervix:     Appearance healthy, no lesions present, nontender to palpation, no bleeding present, string present  Uterus:     Nontender to palpation, no masses present, position anteflexed, mobility: normal  Adnexa:     No adnexal tenderness present, no adnexal masses present  Perineum:     Perineum within normal limits, no evidence of trauma, no rashes or skin lesions present  Anus:     Anus within normal limits, no hemorrhoids present  Inguinal Lymph Nodes:     No lymphadenopathy present  Pubic Hair:     Normal pubic hair distribution for age  Genitalia and Groin:     No rashes present, no lesions present, no areas of discoloration, no masses present       In-Clinic Test Results:  No results found for this or any previous visit (from the past 24 hour(s)).    ASSESSMENT/PLAN:                                                        ICD-10-CM    1. Routine screening for STI (sexually transmitted infection)  Z11.3 NEISSERIA GONORRHOEA PCR     CHLAMYDIA TRACHOMATIS PCR     Treponema Abs w Reflex to RPR and Titer     HIV Antigen Antibody Combo     Hepatitis C Screen Reflex to HCV RNA Quant and Genotype     Bacterial Vaginosis Smear     Fungal or Yeast Culture Routine     Treponema Abs w Reflex to RPR and Titer     HIV Antigen Antibody Combo     Hepatitis C Screen Reflex to HCV RNA Quant and Genotype      2. Vaginal irritation  N89.8 NEISSERIA GONORRHOEA PCR     CHLAMYDIA TRACHOMATIS PCR     Treponema Abs w Reflex to RPR and Titer     HIV Antigen Antibody Combo     Hepatitis C Screen Reflex to HCV RNA Quant and Genotype     Bacterial Vaginosis Smear     Fungal  or Yeast Culture Routine     Treponema Abs w Reflex to RPR and Titer     HIV Antigen Antibody Combo     Hepatitis C Screen Reflex to HCV RNA Quant and Genotype      3. IUD (intrauterine device) in place  Z97.5           COUNSELING:  STI screening done today  Will treat infections if indicated by results.  If + for yeast, desires PO diflucan.  If + for BV, desires PO flagyl  Vaginitis handout provided  return to clinic PRN      Minerva RYDER CNM, [facility]  [phone]

## 2023-03-29 ENCOUNTER — OFFICE VISIT (OUTPATIENT)
Dept: MIDWIFE SERVICES | Facility: CLINIC | Age: 28
End: 2023-03-29
Payer: COMMERCIAL

## 2023-03-29 VITALS
SYSTOLIC BLOOD PRESSURE: 100 MMHG | WEIGHT: 221.4 LBS | DIASTOLIC BLOOD PRESSURE: 76 MMHG | BODY MASS INDEX: 31.7 KG/M2 | HEIGHT: 70 IN

## 2023-03-29 DIAGNOSIS — Z11.3 ROUTINE SCREENING FOR STI (SEXUALLY TRANSMITTED INFECTION): Primary | ICD-10-CM

## 2023-03-29 DIAGNOSIS — Z97.5 IUD (INTRAUTERINE DEVICE) IN PLACE: ICD-10-CM

## 2023-03-29 DIAGNOSIS — N89.8 VAGINAL IRRITATION: ICD-10-CM

## 2023-03-29 PROCEDURE — 36415 COLL VENOUS BLD VENIPUNCTURE: CPT | Performed by: NURSE PRACTITIONER

## 2023-03-29 PROCEDURE — 86803 HEPATITIS C AB TEST: CPT | Performed by: NURSE PRACTITIONER

## 2023-03-29 PROCEDURE — 87205 SMEAR GRAM STAIN: CPT | Performed by: NURSE PRACTITIONER

## 2023-03-29 PROCEDURE — 99213 OFFICE O/P EST LOW 20 MIN: CPT | Performed by: NURSE PRACTITIONER

## 2023-03-29 PROCEDURE — 86780 TREPONEMA PALLIDUM: CPT | Performed by: NURSE PRACTITIONER

## 2023-03-29 PROCEDURE — 87102 FUNGUS ISOLATION CULTURE: CPT | Performed by: NURSE PRACTITIONER

## 2023-03-29 PROCEDURE — 87591 N.GONORRHOEAE DNA AMP PROB: CPT | Performed by: NURSE PRACTITIONER

## 2023-03-29 PROCEDURE — 87389 HIV-1 AG W/HIV-1&-2 AB AG IA: CPT | Performed by: NURSE PRACTITIONER

## 2023-03-29 PROCEDURE — 87491 CHLMYD TRACH DNA AMP PROBE: CPT | Performed by: NURSE PRACTITIONER

## 2023-03-29 ASSESSMENT — PATIENT HEALTH QUESTIONNAIRE - PHQ9
5. POOR APPETITE OR OVEREATING: NOT AT ALL
SUM OF ALL RESPONSES TO PHQ QUESTIONS 1-9: 2

## 2023-03-29 ASSESSMENT — ANXIETY QUESTIONNAIRES

## 2023-03-29 NOTE — PATIENT INSTRUCTIONS
Vaginitis (Vaginal Irritation/Infection)    Vaginitis is very common!  The most common vaginal infections are bacterial vaginosis or yeast. These infections are not sexually transmitted but can be incredibly uncomfortable. Seek care from your midwife if signs or symptoms arise.     Normal vaginal discharge:    Is white, clear, thick or thin (it may change depending on where you are in your cycle)  Does not have a foul odor  The amount of discharge varies    Abnormal discharge/symptoms:     Itching in and around the vagina  Redness, pain or swelling  Discharge that is foamy, greenish, curd like, or bloody  Foul smelling odor  Pain when urinating or having sex  Fever    Causes of vaginal infections:    Good bacteria from the vagina have been destroyed by bad bacteria  Reaction to something in the vagina such as a tampon or scented/perfumed soaps or bubble bath  STI's  Sensitivities to soaps/detergents/dryer sheets, lubricants, etc.  Hormonal changes  Recent use of antibiotics   Infections can also occur after you've had intercourse with a new partner or if you have had frequent intercourse       Here is a list of suggestions that may help prevent/treat vaginal infections and will help maintain a healthy vaginal environment:      1.  Boosting your immune system so you can heal faster    Make sure you are getting adequate sleep  Drink 2-3 quarts of fluids per day, Cranberries or cranberry juice (unsweetened)  Eat more nuts, grains, raw veggies, yogurt, marissa, grapefruit  Decrease intake of refined sugar, red meat and alcohol  Echinacea - 3 times a day for chronic problem or every 2 hours for acute symptoms; use as directed on bottle          2.  Changing the vaginal environment to a more acid state     Soak in a warm bath tub with one cup of vinegar or lemon juice. Do not use scented soap, bubble bath, or oils.       3.  Increasing the good healthy bacteria    At each meal drink 1 tsp apple cider vinegar and 1 tsp  honey in   cup warm water  Eat garlic daily, capsules or fresh.    Take probiotics 4-8 billion units/day      4.  Preventive measures    Wear cotton underwear, no thongs.  Do not wear tight clothes or pantyhose  Shower soon after working or change out of sweaty clothing   Do not wear underwear to bed.  The vaginal environment needs to breathe  Never douche or use vaginal , the vagina is self-cleaning!  Use white, unscented toilet paper.  Do not use baby wipes.  Wipe from front to back  Use only unscented tampons and pads, buy organic products if desired  Do not use perfumes/oils/lotions near your vagina or take bubble baths  Use only mild, unscented soaps around your vaginal area   Do not use fabric softeners/dryer sheets  Use gentle, unscented detergent, consider buying non-petroleum based detergents  Use only water based lubricants during sexual contact  Abstain from intercourse during times of infection      If your symptoms do not resolve or if you have questions please call:     [facility]   [phone]

## 2023-03-30 LAB
C TRACH DNA SPEC QL NAA+PROBE: NEGATIVE
HCV AB SERPL QL IA: NONREACTIVE
HIV 1+2 AB+HIV1 P24 AG SERPL QL IA: NONREACTIVE
N GONORRHOEA DNA SPEC QL NAA+PROBE: NEGATIVE
NUGENT SCORE: 3
T PALLIDUM AB SER QL: NONREACTIVE
WHITE BLOOD CELLS: NORMAL

## 2023-04-03 LAB — BACTERIA SPEC CULT: NO GROWTH

## 2023-06-12 ENCOUNTER — OFFICE VISIT (OUTPATIENT)
Dept: MIDWIFE SERVICES | Facility: CLINIC | Age: 28
End: 2023-06-12
Payer: COMMERCIAL

## 2023-06-12 VITALS — SYSTOLIC BLOOD PRESSURE: 106 MMHG | WEIGHT: 210.1 LBS | DIASTOLIC BLOOD PRESSURE: 78 MMHG | BODY MASS INDEX: 30.15 KG/M2

## 2023-06-12 DIAGNOSIS — Z11.3 ROUTINE SCREENING FOR STI (SEXUALLY TRANSMITTED INFECTION): Primary | ICD-10-CM

## 2023-06-12 DIAGNOSIS — N90.89 CLITORAL IRRITATION: ICD-10-CM

## 2023-06-12 DIAGNOSIS — Z12.4 PAP SMEAR FOR CERVICAL CANCER SCREENING: ICD-10-CM

## 2023-06-12 PROCEDURE — 86803 HEPATITIS C AB TEST: CPT | Performed by: REGISTERED NURSE

## 2023-06-12 PROCEDURE — 86780 TREPONEMA PALLIDUM: CPT | Performed by: REGISTERED NURSE

## 2023-06-12 PROCEDURE — 36415 COLL VENOUS BLD VENIPUNCTURE: CPT | Performed by: REGISTERED NURSE

## 2023-06-12 PROCEDURE — 87389 HIV-1 AG W/HIV-1&-2 AB AG IA: CPT | Performed by: REGISTERED NURSE

## 2023-06-12 PROCEDURE — 99000 SPECIMEN HANDLING OFFICE-LAB: CPT | Performed by: REGISTERED NURSE

## 2023-06-12 PROCEDURE — 87798 DETECT AGENT NOS DNA AMP: CPT | Mod: 90 | Performed by: REGISTERED NURSE

## 2023-06-12 PROCEDURE — 87591 N.GONORRHOEAE DNA AMP PROB: CPT | Performed by: REGISTERED NURSE

## 2023-06-12 PROCEDURE — 99213 OFFICE O/P EST LOW 20 MIN: CPT | Performed by: REGISTERED NURSE

## 2023-06-12 PROCEDURE — 87563 M. GENITALIUM AMP PROBE: CPT | Mod: 90 | Performed by: REGISTERED NURSE

## 2023-06-12 PROCEDURE — 87491 CHLMYD TRACH DNA AMP PROBE: CPT | Performed by: REGISTERED NURSE

## 2023-06-12 PROCEDURE — 87210 SMEAR WET MOUNT SALINE/INK: CPT | Performed by: REGISTERED NURSE

## 2023-06-12 PROCEDURE — G0145 SCR C/V CYTO,THINLAYER,RESCR: HCPCS | Performed by: REGISTERED NURSE

## 2023-06-12 NOTE — PROGRESS NOTES
SUBJECTIVE:  Edgar Rowell  is a 28 year old female  who presents for an STI screen.  She regularly screens between partners and she does have a new partner.  She feels safe with partner. Male partner. She is currently using IUD for birth control.    Patient complains of clitoral pain for the last 4 days. Is improving. No incident she ties to for injury. No change to discharge/symptoms. Does have hx of hsv but has not had outbreak in a long time. Has not noticed any lesions. Does have history of recurrent BV since getting IUD,    Patient Active Problem List   Diagnosis     Patellofemoral syndrome, right     Dysmenorrhea     IUD (intrauterine device) in place     Past Medical History:   Diagnosis Date     Closed fracture of elbow      Closed fracture of unspecified part of femur 1999     Ventricular septal defect     Closed spontaneously     Past Surgical History:   Procedure Laterality Date     [facility] NONSPECIFIC PROCEDURE  6/14/2004    Closed reduction with percutaneous pinning, left elbow.     Current Outpatient Medications   Medication Sig Dispense Refill     levonorgestrel (MIRENA) 20 MCG/24HR IUD 1 each (20 mcg) by Intrauterine route once for 1 dose 1 each 0     spironolactone (ALDACTONE) 50 MG tablet Take 50 mg by mouth       valACYclovir (VALTREX) 500 MG tablet TAKE 1 TABLET(500 MG) BY MOUTH DAILY 90 tablet 1     Allergies   Allergen Reactions     No Known Drug Allergy        Health maintenance updated:  yes    ROS:   ROS: 10 point ROS neg other than the symptoms noted above in the HPI.      PHYSICAL EXAM:    /78 (BP Location: Right arm, Cuff Size: Adult Regular)   Wt 95.3 kg (210 lb 1.6 oz)   BMI 30.15 kg/m      General appearance: healthy, alert, no distress and cooperative.  Pelvic Exam:  Vulva: No lesions, no adenopathy, BUS WNL. Micro laceration on right clitoral mo. Not consistent with herpetic lesion. No s/s of infection   Vagina: Moist, pink, discharge normal  well rugated, no  lesions  Cervix:smooth, pink, no visible lesions. PAP collected. IUD strings seen at normal length   Rectal exam: deferred    ASSESSMENT/PLAN    ICD-10-CM    1. Routine screening for STI (sexually transmitted infection)  Z11.3 NEISSERIA GONORRHOEA PCR     CHLAMYDIA TRACHOMATIS PCR     Wet preparation     Urogenital Ureaplasma and Mycoplasma Species by PCR     Hepatitis C Screen Reflex to HCV RNA Quant and Genotype     Treponema Abs w Reflex to RPR and Titer     HIV Antigen Antibody Combo     Hepatitis C Screen Reflex to HCV RNA Quant and Genotype     Treponema Abs w Reflex to RPR and Titer     HIV Antigen Antibody Combo     Pap screen reflex to HPV if ASCUS - recommend age 25 - 29      2. Clitoral irritation  N90.89 Urogenital Ureaplasma and Mycoplasma Species by PCR      3. Pap smear for cervical cancer screening  Z12.4 Pap screen reflex to HPV if ASCUS - recommend age 25 - 29          No results found for any visits on 06/12/23.         COUNSELING    Condoms strongly recommended along with safe sex practices.    Contraception methods discussed.  Happy with current method    Partner(s) encouraged  to be screened/treated.    Reportable STI's discussed.     Follow up as needed    MYLENE not required (except pregnancy and high risk behavior)    Recommend continued screening between partners     Return to clinic or call with questions or concerns    - STI panel completed. Wet prep added for symptoms and hx of recurrent BV. Urea/microplasma added for centralized pain near clitoris/urethra.   - pap due in 3 mo, collected early   - Recommend gentle wiping, sitz baths, and if needed topical steroid. She feels like it is getting better but will let us know if it worsens.    Results as available    ALEKSEY Pratt CNM

## 2023-06-12 NOTE — NURSING NOTE
"Chief Complaint   Patient presents with     Vaginal Problem     STD screening    No SX   Clitoris is sore/tender         Initial /78 (BP Location: Right arm, Cuff Size: Adult Regular)   Wt 95.3 kg (210 lb 1.6 oz)   BMI 30.15 kg/m   Estimated body mass index is 30.15 kg/m  as calculated from the following:    Height as of 3/29/23: 1.778 m (5' 10\").    Weight as of this encounter: 95.3 kg (210 lb 1.6 oz).  BP completed using cuff size: regular    Questioned patient about current smoking habits.  Pt. has never smoked.          The following HM Due: NONE      Stephanie Chew CMA on 2023 at 11:06 AM     "

## 2023-06-13 DIAGNOSIS — A74.9 CHLAMYDIA INFECTION: Primary | ICD-10-CM

## 2023-06-13 LAB
C TRACH DNA SPEC QL NAA+PROBE: POSITIVE
HCV AB SERPL QL IA: NONREACTIVE
HIV 1+2 AB+HIV1 P24 AG SERPL QL IA: NONREACTIVE
N GONORRHOEA DNA SPEC QL NAA+PROBE: NEGATIVE
T PALLIDUM AB SER QL: NONREACTIVE

## 2023-06-13 RX ORDER — DOXYCYCLINE 100 MG/1
100 CAPSULE ORAL 2 TIMES DAILY
Qty: 14 CAPSULE | Refills: 0 | Status: SHIPPED | OUTPATIENT
Start: 2023-06-13 | End: 2023-07-19

## 2023-06-14 LAB
BKR LAB AP GYN ADEQUACY: NORMAL
BKR LAB AP GYN INTERPRETATION: NORMAL
BKR LAB AP HPV REFLEX: NORMAL
BKR LAB AP PREVIOUS ABNORMAL: NORMAL
PATH REPORT.COMMENTS IMP SPEC: NORMAL
PATH REPORT.COMMENTS IMP SPEC: NORMAL
PATH REPORT.RELEVANT HX SPEC: NORMAL

## 2023-06-20 ENCOUNTER — TELEPHONE (OUTPATIENT)
Dept: MIDWIFE SERVICES | Facility: CLINIC | Age: 28
End: 2023-06-20

## 2023-06-20 DIAGNOSIS — N34.1 NONGONOCOCCAL URETHRITIS DUE TO UREAPLASMA UREALYTICUM: Primary | ICD-10-CM

## 2023-06-20 DIAGNOSIS — A49.3 NONGONOCOCCAL URETHRITIS DUE TO UREAPLASMA UREALYTICUM: Primary | ICD-10-CM

## 2023-06-20 LAB
M GENITALIUM DNA SPEC QL NAA+PROBE: NOT DETECTED
M HOMINIS DNA SPEC QL NAA+PROBE: DETECTED
U PARVUM DNA SPEC QL NAA+PROBE: DETECTED
U UREALYTICUM DNA SPEC QL NAA+PROBE: DETECTED

## 2023-06-20 RX ORDER — MOXIFLOXACIN HYDROCHLORIDE 400 MG/1
400 TABLET ORAL DAILY
Qty: 7 TABLET | Refills: 0 | Status: SHIPPED | OUTPATIENT
Start: 2023-06-20 | End: 2023-06-26

## 2023-06-20 NOTE — TELEPHONE ENCOUNTER
Results and treatment reviewed with Edgar over the phone. Prescription sent to pharmacy on file.   Crystal Wright CNM

## 2023-06-20 NOTE — TELEPHONE ENCOUNTER
Pt calls about ureaplasma results.    I am not real familiar with this result, can someone call her?    Kim PUCKETT RN BSN

## 2023-06-22 ENCOUNTER — TELEPHONE (OUTPATIENT)
Dept: MIDWIFE SERVICES | Facility: CLINIC | Age: 28
End: 2023-06-22

## 2023-06-24 ENCOUNTER — NURSE TRIAGE (OUTPATIENT)
Dept: NURSING | Facility: CLINIC | Age: 28
End: 2023-06-24

## 2023-06-24 NOTE — TELEPHONE ENCOUNTER
Tested positive for urogenetal something. Whomever is in charge of the med being sent, didn't get back to her. Insurance issues. Talked with someone on Thursday and never got the call back on Friday.  [facility] in [place]. She wants to get the treatment started.     moxifloxacin (AVELOX) 400 MG tablet 7 tablet 0 6/20/2023  --   Sig - Route: Take 1 tablet (400 mg) by mouth daily - Oral   Sent to pharmacy as: Moxifloxacin HCl 400 MG Oral Tablet (AVELOX)     2:16 p.m. I paged Carmen Xie CNM to call me directly. Carmen called back and stated the prior authorization is initiated by the clinic RNs so she's unsure what the status of the request is. I called and explained that to the patient and that after hours and on weekends, nothing can be done to move the prior authorization faster. The patient will call the clinic on Monday.  Philly Tilley RN  [facility]    Reason for Disposition    [1] Caller has NON-URGENT medicine question about med that PCP prescribed AND [2] triager unable to answer question    Additional Information    Negative: [1] Intentional drug overdose AND [2] suicidal thoughts or ideas    Negative: Drug overdose and triager unable to answer question    Negative: Caller requesting a renewal or refill of a medicine patient is currently taking    Negative: Caller requesting information unrelated to medicine    Negative: Caller requesting information about COVID-19 Vaccine    Negative: Caller requesting information about Emergency Contraception    Negative: Caller requesting information about Combined Birth Control Pills    Negative: Caller requesting information about Progestin Birth Control Pills    Negative: Caller requesting information about Post-Op pain or medicines    Negative: Caller requesting a prescription antibiotic (such as Penicillin) for Strep throat and has a positive culture result    Negative: Caller requesting a prescription anti-viral med (such as  Tamiflu) and has influenza (flu) symptoms    Negative: Immunization reaction suspected    Negative: Rash while taking a medicine or within 3 days of stopping it    Negative: [1] Asthma and [2] having symptoms of asthma (cough, wheezing, etc.)    Negative: [1] Symptom of illness (e.g., headache, abdominal pain, earache, vomiting) AND [2] more than mild    Negative: Breastfeeding questions about mother's medicines and diet    Negative: MORE THAN A DOUBLE DOSE of a prescription or over-the-counter (OTC) drug    Negative: [1] DOUBLE DOSE (an extra dose or lesser amount) of prescription drug AND [2] any symptoms (e.g., dizziness, nausea, pain, sleepiness)    Negative: [1] DOUBLE DOSE (an extra dose or lesser amount) of over-the-counter (OTC) drug AND [2] any symptoms (e.g., dizziness, nausea, pain, sleepiness)    Negative: Took another person's prescription drug    Negative: [1] DOUBLE DOSE (an extra dose or lesser amount) of prescription drug AND [2] NO symptoms (Exception: a double dose of antibiotics)    Negative: Diabetes drug error or overdose (e.g., took wrong type of insulin or took extra dose)    Negative: [1] Prescription not at pharmacy AND [2] was prescribed by PCP recently (Exception: triager has access to EMR and prescription is recorded there. Go to Home Care and confirm for pharmacy.)    Negative: [1] Pharmacy calling with prescription question AND [2] triager unable to answer question    Negative: [1] Caller has URGENT medicine question about med that PCP or specialist prescribed AND [2] triager unable to answer question    Negative: Medicine patch causing local rash or itching    Negative: [1] Caller has medicine question about med NOT prescribed by PCP AND [2] triager unable to answer question (e.g., compatibility with other med, storage)    Negative: Prescription request for new medicine (not a refill)    Protocols used: MEDICATION QUESTION CALL-A-

## 2023-06-26 DIAGNOSIS — A60.04 HERPES SIMPLEX VIRUS (HSV) INFECTION OF VAGINA: ICD-10-CM

## 2023-06-26 DIAGNOSIS — A49.3 NONGONOCOCCAL URETHRITIS DUE TO UREAPLASMA UREALYTICUM: Primary | ICD-10-CM

## 2023-06-26 DIAGNOSIS — N34.1 NONGONOCOCCAL URETHRITIS DUE TO UREAPLASMA UREALYTICUM: Primary | ICD-10-CM

## 2023-06-26 RX ORDER — LEVOFLOXACIN 500 MG/1
500 TABLET, FILM COATED ORAL DAILY
Qty: 7 TABLET | Refills: 0 | Status: SHIPPED | OUTPATIENT
Start: 2023-06-26 | End: 2023-07-03

## 2023-06-26 NOTE — TELEPHONE ENCOUNTER
Central Prior Authorization Team  Phone: [phone]    PA Initiation    Medication: MOXIFLOXACIN  MG PO TABS  Insurance Company: [employer] - Phone [phone] Fax [phone]  [id number] [place] [id number] [facility]  AT [facility]  Filling Pharmacy Phone: [phone]  Filling Pharmacy Fax:    Start Date: 6/26/2023

## 2023-06-26 NOTE — TELEPHONE ENCOUNTER
PRIOR AUTHORIZATION DENIED    Medication: MOXIFLOXACIN  MG PO TABS  Insurance Company: [employer] Plus [employer]AP - Phone [phone] Fax [phone]  Denial Date: 6/26/2023    Denial Rational:     Appeal Information: If provider would like to appeal please provide a letter of medical necessity.

## 2023-06-26 NOTE — TELEPHONE ENCOUNTER
Hi,   This medication was denied. If the provider would like to appeal please provide a letter of medical necessity and route back to the team. Otherwise please notify the patient and close the encounter.   Thank you,   Central PA Team

## 2023-06-26 NOTE — TELEPHONE ENCOUNTER
Call to patient, discussed alternate medication prescribed by CHEO Gonzalez (message sent to patient by provider as well).  Discussed repeat testing recommended, scheduled appointment with CHEO Gonzalez for mid July.  Patient denies further needs/concerns at this time.    Kayce MCFADDEN RN

## 2023-07-19 ENCOUNTER — OFFICE VISIT (OUTPATIENT)
Dept: MIDWIFE SERVICES | Facility: CLINIC | Age: 28
End: 2023-07-19
Payer: COMMERCIAL

## 2023-07-19 VITALS
SYSTOLIC BLOOD PRESSURE: 120 MMHG | BODY MASS INDEX: 30.06 KG/M2 | DIASTOLIC BLOOD PRESSURE: 70 MMHG | HEIGHT: 70 IN | WEIGHT: 210 LBS

## 2023-07-19 DIAGNOSIS — N34.1 NONGONOCOCCAL URETHRITIS DUE TO UREAPLASMA UREALYTICUM: Primary | ICD-10-CM

## 2023-07-19 DIAGNOSIS — A49.3 NONGONOCOCCAL URETHRITIS DUE TO UREAPLASMA UREALYTICUM: Primary | ICD-10-CM

## 2023-07-19 DIAGNOSIS — Z86.19 HISTORY OF CHLAMYDIA: ICD-10-CM

## 2023-07-19 PROCEDURE — 87591 N.GONORRHOEAE DNA AMP PROB: CPT | Performed by: ADVANCED PRACTICE MIDWIFE

## 2023-07-19 PROCEDURE — 87491 CHLMYD TRACH DNA AMP PROBE: CPT | Performed by: ADVANCED PRACTICE MIDWIFE

## 2023-07-19 PROCEDURE — 99213 OFFICE O/P EST LOW 20 MIN: CPT | Performed by: ADVANCED PRACTICE MIDWIFE

## 2023-07-19 PROCEDURE — 87563 M. GENITALIUM AMP PROBE: CPT | Mod: 90 | Performed by: ADVANCED PRACTICE MIDWIFE

## 2023-07-19 PROCEDURE — 99000 SPECIMEN HANDLING OFFICE-LAB: CPT | Performed by: ADVANCED PRACTICE MIDWIFE

## 2023-07-19 PROCEDURE — 87798 DETECT AGENT NOS DNA AMP: CPT | Mod: 90 | Performed by: ADVANCED PRACTICE MIDWIFE

## 2023-07-19 NOTE — NURSING NOTE
"Chief Complaint   Patient presents with     Follow Up     +chlamydia, ureaplasma and mycoplasma       Initial /70 (BP Location: Right arm, Patient Position: Chair, Cuff Size: Adult Large)   Ht 1.778 m (5' 10\")   Wt 95.3 kg (210 lb)   Breastfeeding No   BMI 30.13 kg/m   Estimated body mass index is 30.13 kg/m  as calculated from the following:    Height as of this encounter: 1.778 m (5' 10\").    Weight as of this encounter: 95.3 kg (210 lb).  BP completed using cuff size: large    Questioned patient about current smoking habits.  Pt. has never smoked.          The following HM Due: NONE  Lakeisha Centeno CMA    "

## 2023-07-19 NOTE — PROGRESS NOTES
"SUBJECTIVE:  Edgar Rowell  is a 28 year old female  who presents for an STI test of cure. She was diagnosed with chlamydia, ureaplasma and mycoplasma on 6/12. She was treated with Moxifloxacin 400mg x7 days and Doxycycline. She states she completed her therapy. She denies any symptoms.       Patient Active Problem List   Diagnosis     Patellofemoral syndrome, right     Dysmenorrhea     IUD (intrauterine device) in place     Past Medical History:   Diagnosis Date     Closed fracture of elbow      Closed fracture of unspecified part of femur 1999     Ventricular septal defect     Closed spontaneously     Past Surgical History:   Procedure Laterality Date     ZZC NONSPECIFIC PROCEDURE  6/14/2004    Closed reduction with percutaneous pinning, left elbow.     Current Outpatient Medications   Medication Sig Dispense Refill     levonorgestrel (MIRENA) 20 MCG/24HR IUD 1 each (20 mcg) by Intrauterine route once for 1 dose 1 each 0     Allergies   Allergen Reactions     No Known Drug Allergy            ROS:   ROS: 10 point ROS neg other than the symptoms noted above in the HPI.    PHYSICAL EXAM:    /70 (BP Location: Right arm, Patient Position: Chair, Cuff Size: Adult Large)   Ht 1.778 m (5' 10\")   Wt 95.3 kg (210 lb)   Breastfeeding No   BMI 30.13 kg/m      General appearance: healthy, alert, no distress and cooperative.  Pelvic Exam:  Vulva: No lesions, no adenopathy, BUS WNL  Vagina: Moist, pink, discharge normal  well rugated, no lesions  Rectal exam: deferred    ASSESSMENT/PLAN    ICD-10-CM    1. 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 History of chlamydia  Z86.19 NEISSERIA GONORRHOEA PCR     CHLAMYDIA TRACHOMATIS PCR          No results found for any visits on 07/19/23.         COUNSELING    Condoms strongly recommended along with safe sex practices.    Contraception methods discussed.      Partner(s) encouraged  to " be screened/treated.    Reportable STI's discussed.    Follow up as needed    Return to clinic or call with questions or concerns    ALEKSEY Mota, CNM

## 2023-07-20 LAB
C TRACH DNA SPEC QL NAA+PROBE: NEGATIVE
N GONORRHOEA DNA SPEC QL NAA+PROBE: NEGATIVE

## 2023-07-21 LAB
M GENITALIUM DNA SPEC QL NAA+PROBE: NOT DETECTED
M HOMINIS DNA SPEC QL NAA+PROBE: NOT DETECTED
U PARVUM DNA SPEC QL NAA+PROBE: NOT DETECTED
U UREALYTICUM DNA SPEC QL NAA+PROBE: NOT DETECTED

## 2023-10-04 ENCOUNTER — MYC MEDICAL ADVICE (OUTPATIENT)
Dept: OBGYN | Facility: CLINIC | Age: 28
End: 2023-10-04
Payer: COMMERCIAL

## 2023-10-04 DIAGNOSIS — A60.04 HERPES SIMPLEX VIRUS (HSV) INFECTION OF VAGINA: ICD-10-CM

## 2023-10-04 NOTE — TELEPHONE ENCOUNTER
Patient would like a call to discuss the mychart conversations below. Can call back tomorrow either after 9am or before 10:04am. Thank you!

## 2023-10-05 RX ORDER — VALACYCLOVIR HYDROCHLORIDE 500 MG/1
500 TABLET, FILM COATED ORAL DAILY
Qty: 30 TABLET | Refills: 0 | Status: SHIPPED | OUTPATIENT
Start: 2023-10-05 | End: 2024-01-17

## 2023-10-05 NOTE — TELEPHONE ENCOUNTER
RX pended.     Please verify her pharmacy.     I don't know why, but the valtrex is not on her MAR. I did fill it back in 1/2023. She needs an annual exam but not a pap.     PAP NIL in 6/2023 by a Midwife group     I havent seen her in our office since August 2022. No further RX until she's seen. Or she can call Midwife group whom she's been with for refills.

## 2024-01-03 NOTE — TELEPHONE ENCOUNTER
Prior Authorization Retail Medication Request    Medication/Dose: Moxifloxacin 400mg daily for seven days  ICD code (if different than what is on RX):  N34.1  Previously Tried and Failed:  Doxycycline  Rationale:  Was previously on Doxycycline, Moxifloxacin is recommended treatment    Insurance Name:  [employer]  Insurance ID:  [id number]        Pharmacy Information (if different than what is on RX)  [facility]:  [facility]  Phone:  [phone]        *Received a prior auth form from [facility] with the key: [id number]   normal...

## 2024-01-06 ENCOUNTER — HEALTH MAINTENANCE LETTER (OUTPATIENT)
Age: 29
End: 2024-01-06

## 2024-01-17 ENCOUNTER — OFFICE VISIT (OUTPATIENT)
Dept: FAMILY MEDICINE | Facility: CLINIC | Age: 29
End: 2024-01-17
Payer: COMMERCIAL

## 2024-01-17 VITALS
TEMPERATURE: 98 F | RESPIRATION RATE: 14 BRPM | HEIGHT: 70 IN | HEART RATE: 54 BPM | WEIGHT: 210.2 LBS | BODY MASS INDEX: 30.09 KG/M2 | OXYGEN SATURATION: 100 % | SYSTOLIC BLOOD PRESSURE: 108 MMHG | DIASTOLIC BLOOD PRESSURE: 58 MMHG

## 2024-01-17 DIAGNOSIS — A60.00 RECURRENT GENITAL HSV (HERPES SIMPLEX VIRUS) INFECTION: ICD-10-CM

## 2024-01-17 DIAGNOSIS — Z00.00 ROUTINE GENERAL MEDICAL EXAMINATION AT A HEALTH CARE FACILITY: Primary | ICD-10-CM

## 2024-01-17 DIAGNOSIS — R41.840 CONCENTRATION DEFICIT: ICD-10-CM

## 2024-01-17 DIAGNOSIS — Z97.5 PRESENCE OF 52 MG LEVONORGESTREL-RELEASING INTRAUTERINE DEVICE (IUD): ICD-10-CM

## 2024-01-17 PROCEDURE — 99213 OFFICE O/P EST LOW 20 MIN: CPT | Mod: 25 | Performed by: STUDENT IN AN ORGANIZED HEALTH CARE EDUCATION/TRAINING PROGRAM

## 2024-01-17 PROCEDURE — 99395 PREV VISIT EST AGE 18-39: CPT | Performed by: STUDENT IN AN ORGANIZED HEALTH CARE EDUCATION/TRAINING PROGRAM

## 2024-01-17 RX ORDER — VALACYCLOVIR HYDROCHLORIDE 500 MG/1
500 TABLET, FILM COATED ORAL 2 TIMES DAILY
Qty: 6 TABLET | Refills: 0 | Status: SHIPPED | OUTPATIENT
Start: 2024-01-17 | End: 2024-04-30

## 2024-01-17 ASSESSMENT — PATIENT HEALTH QUESTIONNAIRE - PHQ9
SUM OF ALL RESPONSES TO PHQ QUESTIONS 1-9: 3
SUM OF ALL RESPONSES TO PHQ QUESTIONS 1-9: 3
10. IF YOU CHECKED OFF ANY PROBLEMS, HOW DIFFICULT HAVE THESE PROBLEMS MADE IT FOR YOU TO DO YOUR WORK, TAKE CARE OF THINGS AT HOME, OR GET ALONG WITH OTHER PEOPLE: SOMEWHAT DIFFICULT

## 2024-01-17 NOTE — PROGRESS NOTES
Assessment & Plan     Routine general medical examination at a health care facility  Maribel is a 28-year-old female who presents today for adult preventative exam.  Last Pap smear was June 2023, NILM, repeat in 3 years.  Has history of STI, chlamydia and Ureaplasma in June 2023, received treatment, had negative test of cure in July 2023.  No further screening for gonorrhea chlamydia desired by patient.  Examination today normal.    Did recommend vaccinations to HPV, hepatitis B, COVID and influenza, she did not wish to get any vaccinations today, I did give her handouts with information on each of these vaccines, encouraged her to make a nursing only appointment to have these vaccines given if she so chooses.  She knows that it is my recommendation as well as the CDC recommendation to receive these vaccinations.    Concentration deficit  Patient reports concentration deficits that are more noticeable since she started working.  Also noticed these during college.  Has a difficult time completing tasks, will start tasks at home and be distracted never finished them.  She is an  in the middle school, and students who talk out of turn may interrupt her thought process and make a difficult for her to remember where she is in the lesson.  She has not had any childhood history that is reliable, reports her mother has passed it would be the only resource.  There is no family history of ADHD diagnosis.  I recommended that she have a referral to mental health provider for evaluation for ADHD, patient is in agreement and referral was placed.  If patient does receive diagnosis of ADHD, although she can return and I will start her medications that she requires.  - Adult Mental Health  Referral    Recurrent genital HSV (herpes simplex virus) infection  Reports last HSV outbreak was in 2021, we will provide a Valtrex refill for patient in the event that she does have an outbreak this year, they seem to be  "related to stress for her.  She was to start taking the medication as soon as she is having any prodrome symptoms.  - valACYclovir (VALTREX) 500 MG tablet  Dispense: 6 tablet; Refill: 0      Presence of IUD  Mirena IUD in place.  Was placed in 2020 January, recommend removal in 2028 January.              BMI  Estimated body mass index is 30.16 kg/m  as calculated from the following:    Height as of this encounter: 1.778 m (5' 10\").    Weight as of this encounter: 95.3 kg (210 lb 3.2 oz).       Follow-up after ADHD diagnosis    Israel Hernández is a 28 year old, presenting for the following health issues:  Establish Care (ADHD questions. Valacyclovir prescription questions.)        1/17/2024     3:43 PM   Additional Questions   Roomed by Kailee PATTON     History of Present Illness       Reason for visit:  New primary, along with looking for adhd help.    She eats 2-3 servings of fruits and vegetables daily.She consumes 0 sweetened beverage(s) daily.She exercises with enough effort to increase her heart rate 9 or less minutes per day.  She exercises with enough effort to increase her heart rate 3 or less days per week.   She is taking medications regularly.     Mirena IUD was given in 2015 and removed in 2020, replaced in JAN 2020.  No issues with the IUD.       Normal pap smear in June 2023.     Is sexually active with one partner, male.  Previously had chlamydia and ureoplasma in July 2023, treated.     No vaginal discharge, no pelvic pain.    Menstrual bleeding is regular but light. 28 day cycles.     H ? HUMILIATION Within the last year, have you been humiliated or emotionally abused in other ways by your partner or your ex-partner? NO  A ? AFRAID Within the last year, have you been afraid of your partner or ex-partner? NO  R ? RAPE Within the last year, have you been raped or forced to have any kind of sexual activity by your partner or ex-partner? NO  K ? KICK Within the last year, have you been kicked, hit, " "slapped or otherwise physically hurt by your partner or ex-partner? NO  HARK screening negative.     Genital herpes does occur rarely, three outbreaks since it started in 2020, has one episode since 2021.     Is concerned about possibly having ADHD, has noticed since starting work that she has had a difficult time.  Has a therapist, who believes she has ADHD. Her mother is passed away and cannot give us information about her child mo.  While other people are talking has a song in her head, or other distraction occurs, and she will not know what was said to her due to the distraction.  She will have a hard time with blurting out from students in class (she is a ) and forget wheere she was in the lesson.  Cleaning is difficult due to running all over the house doing different tasks and not able to complete any of them due to being distracted by a different task that she then starts.  Is not sure if this started when she was younger.  Did struggle in college, had a hard time getting her work in. Does not believe she is intrusive. Impulsive rarely, 'not super bad'. No other family member are diagnosed with ADHD.         Objective    /58 (BP Location: Right arm, Patient Position: Sitting, Cuff Size: Adult Regular)   Pulse 54   Temp 98  F (36.7  C) (Oral)   Resp 14   Ht 1.778 m (5' 10\")   Wt 95.3 kg (210 lb 3.2 oz)   SpO2 100%   BMI 30.16 kg/m    Body mass index is 30.16 kg/m .    Review of Systems  Constitutional, neuro, ENT, endocrine, pulmonary, cardiac, gastrointestinal, genitourinary, musculoskeletal, integument and psychiatric systems are negative, except as otherwise noted.  Physical Exam   GENERAL: alert and no distress  EYES: Eyes grossly normal to inspection, PERRL and conjunctivae and sclerae normal  HENT: ear canals and TM's normal, nose and mouth without ulcers or lesions  NECK: no adenopathy, no asymmetry, masses, or scars  RESP: lungs clear to auscultation - no rales, " rhonchi or wheezes  CV: regular rate and rhythm, normal S1 S2, no S3 or S4, no murmur, click or rub, no peripheral edema  ABDOMEN: soft, nontender, no hepatosplenomegaly, no masses and bowel sounds normal  MS: no gross musculoskeletal defects noted, no edema  SKIN: no suspicious lesions or rashes  NEURO: Normal strength and tone, mentation intact and speech normal  PSYCH: mentation appears normal, affect normal/bright  LYMPH: no cervical, supraclavicular, axillary, or inguinal adenopathy    Office Visit on 07/19/2023   Component Date Value Ref Range Status    Neisseria gonorrhoeae 07/19/2023 Negative  Negative Final    Negative for N. gonorrhoeae rRNA by transcription mediated amplification. A negative result by transcription mediated amplification does not preclude the presence of C. trachomatis infection because results are dependent on proper and adequate collection, absence of inhibitors and sufficient rRNA to be detected.    Chlamydia trachomatis 07/19/2023 Negative  Negative Final    A negative result by transcription mediated amplification does not preclude the presence of C. trachomatis infection because results are dependent on proper and adequate collection, absence of inhibitors and sufficient rRNA to be detected.    Ureaplasma parvum by PCR 07/19/2023 Not Detected   Final    Ureaplasma urealyticum by PCR 07/19/2023 Not Detected   Final    Mycoplasma hominis by PCR 07/19/2023 Not Detected   Final    Mycoplasma genitalium by PCR 07/19/2023 Not Detected   Final    INTERPRETIVE INFORMATION: Urogenital Ureaplasma and         Mycoplasma Species by PCR    A negative result does not rule out the presence of PCR   inhibitors in the patient specimen or test-specific nucleic   acid in concentrations below the level of detection by this   test.     This test was developed and its performance characteristics   determined by [facility]. It has not been cleared or   approved by the US Food and Drug  Administration. This test   was performed in a CLIA certified laboratory and is   intended for clinical purposes.  Performed by [facility],  [address] [place] [id number] [phone]  [email or web address], Shane Kruse MD, PHD, Lab. Director           Signed Electronically by: Eduardo Rucker MD

## 2024-04-22 ENCOUNTER — TRANSFERRED RECORDS (OUTPATIENT)
Dept: HEALTH INFORMATION MANAGEMENT | Facility: CLINIC | Age: 29
End: 2024-04-22
Payer: COMMERCIAL

## 2024-04-30 ENCOUNTER — MYC REFILL (OUTPATIENT)
Dept: FAMILY MEDICINE | Facility: CLINIC | Age: 29
End: 2024-04-30
Payer: COMMERCIAL

## 2024-04-30 DIAGNOSIS — A60.00 RECURRENT GENITAL HSV (HERPES SIMPLEX VIRUS) INFECTION: ICD-10-CM

## 2024-04-30 RX ORDER — VALACYCLOVIR HYDROCHLORIDE 500 MG/1
500 TABLET, FILM COATED ORAL 2 TIMES DAILY
Qty: 6 TABLET | Refills: 0 | Status: SHIPPED | OUTPATIENT
Start: 2024-04-30 | End: 2024-08-28

## 2024-05-30 ENCOUNTER — TRANSFERRED RECORDS (OUTPATIENT)
Dept: HEALTH INFORMATION MANAGEMENT | Facility: CLINIC | Age: 29
End: 2024-05-30
Payer: COMMERCIAL

## 2024-06-24 ENCOUNTER — TRANSFERRED RECORDS (OUTPATIENT)
Dept: HEALTH INFORMATION MANAGEMENT | Facility: CLINIC | Age: 29
End: 2024-06-24
Payer: COMMERCIAL

## 2024-08-28 ENCOUNTER — OFFICE VISIT (OUTPATIENT)
Dept: FAMILY MEDICINE | Facility: CLINIC | Age: 29
End: 2024-08-28
Payer: COMMERCIAL

## 2024-08-28 VITALS
TEMPERATURE: 97.6 F | BODY MASS INDEX: 30.62 KG/M2 | DIASTOLIC BLOOD PRESSURE: 68 MMHG | WEIGHT: 213.9 LBS | HEIGHT: 70 IN | SYSTOLIC BLOOD PRESSURE: 102 MMHG

## 2024-08-28 DIAGNOSIS — A60.00 RECURRENT GENITAL HSV (HERPES SIMPLEX VIRUS) INFECTION: ICD-10-CM

## 2024-08-28 DIAGNOSIS — F90.0 ADHD (ATTENTION DEFICIT HYPERACTIVITY DISORDER), INATTENTIVE TYPE: Primary | ICD-10-CM

## 2024-08-28 PROCEDURE — G2211 COMPLEX E/M VISIT ADD ON: HCPCS | Performed by: STUDENT IN AN ORGANIZED HEALTH CARE EDUCATION/TRAINING PROGRAM

## 2024-08-28 PROCEDURE — 99214 OFFICE O/P EST MOD 30 MIN: CPT | Performed by: STUDENT IN AN ORGANIZED HEALTH CARE EDUCATION/TRAINING PROGRAM

## 2024-08-28 RX ORDER — METHYLPHENIDATE HYDROCHLORIDE 27 MG/1
27 TABLET ORAL EVERY MORNING
Qty: 30 TABLET | Refills: 0 | Status: SHIPPED | OUTPATIENT
Start: 2024-08-28 | End: 2024-08-29

## 2024-08-28 RX ORDER — VALACYCLOVIR HYDROCHLORIDE 500 MG/1
500 TABLET, FILM COATED ORAL 2 TIMES DAILY PRN
Qty: 6 TABLET | Refills: 11 | Status: SHIPPED | OUTPATIENT
Start: 2024-08-28 | End: 2024-08-29

## 2024-08-28 NOTE — LETTER
[facility]  -- Controlled Medication Agreement    8/28/2024   Edgar Rowell   1995   [id number]       I understand that my provider is prescribing controlled medications to assist me in managing my ADHD.  The risks, benefits, and side effects of these medications have been explained to me and I agree to the following conditions for this type of treatment.    Stimulant Medication Prescribed: Concerta    1.  I will take my medications exactly as prescribed and will not change the medication dosage or schedule without my provider's approval.  Refills will not be given if I  runs out early.     2.  I will keep all regular appointments at this clinic.  If there are three or more missed appointments or appointments canceled less than 2 hours before the scheduled time, my medication may be discontinued.    3.  I understand that prescriptions may only be written for one month at a time, and a written prescription is required each month.  Prescriptions cannot be called in or faxed to the pharmacy.    4.  If the prescription is lost or stolen, replacement is at the discretion of my provider.  I understand that this may mean the prescription might not be replaced.    5.  If I am late for scheduled follow up, I understand that I must make an appointment and that another refill is at the discretion of my provider.  This may mean a prescription for only the amount required until the appointment, regardless of prescription co-pay.  For example, if an appointment is made in 1 week, a prescription might only be written for 7 pills.      I understand that if I violate any of the above conditions, my prescription medications and/or treatment may be terminated.  If the violation includes providing controlled substances to anyone other than to whom the medication is prescribed, a report may be made to my child's physician, pharmacy, and other authorities, including the police.    I have read this contract and it  has been explained to me.  I fully understand the consequences of violating this agreement.    _________________________________/______________/____________________________    Patient signature/Date/Witness

## 2024-08-28 NOTE — PROGRESS NOTES
"  Assessment & Plan     ADHD (attention deficit hyperactivity disorder), inattentive type  Edgar is a 29-year-old female presenting today for treatment for ADHD.  She was diagnosed at [facility], see care everywhere for documentation.  They attempted Strattera, which helped somewhat but did not help enough.  She would like to try something else.  After discussion we will attempt a stimulant medication, starting with Concerta 27 mg daily.  We discussed the side effects and complications that she could expect to include blood pressure issues, decreased appetite, insomnia, tics, and anxiety.  She reports understanding.  A substance use agreement was signed and put into their chart.    She is to follow-up with me in 1 month, at that point we will check her blood pressure, follow-up on effectiveness as well as side effects of the medication.  She reports understanding    Recurrent genital HSV (herpes simplex virus) infection  Reports HSV outbreaks 1-2 times annually, we will provide a Valtrex refill for patient in the event that she does have an outbreak, they seem to be related to stress for her.  She was to start taking the medication as soon as she is having any prodrome symptoms.             BMI  Estimated body mass index is 30.69 kg/m  as calculated from the following:    Height as of this encounter: 1.778 m (5' 10\").    Weight as of this encounter: 97 kg (213 lb 14.4 oz).             Israel Hernández is a 29 year old, presenting for the following health issues:  A.D.H.D (Discuss medications.)        8/28/2024     4:53 PM   Additional Questions   Roomed by DALJIT OSUNA     History of Present Illness       Reason for visit:  Med request for adhd          Edgar had been diagnosed with ADHD by [facility] in April 2024, and had been started on Strattera.  She did have some improvement on Strattera but not quite as much as she was hoping for, still having some issues with attention and concentration.  She was having " "no significant side effects other than some difficulty with sleeping medication.  She would like to try something else.  She no longer wants to see Ivonne for this issue she would like to get medication from her primary care provider.  She is not concerned about whether it is a stimulant or not she wants what ever will be most effective.  She teaches middle school art, finds that she does has a hard time remaining focused.      Review of Systems  Constitutional, neuro, ENT, endocrine, pulmonary, cardiac, gastrointestinal, genitourinary, musculoskeletal, integument and psychiatric systems are negative, except as otherwise noted.      Objective    /68 (BP Location: Right arm, Patient Position: Sitting, Cuff Size: Adult Regular)   Temp 97.6  F (36.4  C) (Oral)   Ht 1.778 m (5' 10\")   Wt 97 kg (213 lb 14.4 oz)   BMI 30.69 kg/m    Body mass index is 30.69 kg/m .  Physical Exam   GENERAL: alert and no distress  NECK: no adenopathy, no asymmetry, masses, or scars  RESP: lungs clear to auscultation - no rales, rhonchi or wheezes  CV: regular rate and rhythm, normal S1 S2, no S3 or S4, no murmur, click or rub, no peripheral edema  MS: no gross musculoskeletal defects noted, no edema    Office Visit on 07/19/2023   Component Date Value Ref Range Status    Neisseria gonorrhoeae 07/19/2023 Negative  Negative Final    Negative for N. gonorrhoeae rRNA by transcription mediated amplification. A negative result by transcription mediated amplification does not preclude the presence of C. trachomatis infection because results are dependent on proper and adequate collection, absence of inhibitors and sufficient rRNA to be detected.    Chlamydia trachomatis 07/19/2023 Negative  Negative Final    A negative result by transcription mediated amplification does not preclude the presence of C. trachomatis infection because results are dependent on proper and adequate collection, absence of inhibitors and sufficient rRNA to be " detected.    Ureaplasma parvum by PCR 07/19/2023 Not Detected   Final    Ureaplasma urealyticum by PCR 07/19/2023 Not Detected   Final    Mycoplasma hominis by PCR 07/19/2023 Not Detected   Final    Mycoplasma genitalium by PCR 07/19/2023 Not Detected   Final    INTERPRETIVE INFORMATION: Urogenital Ureaplasma and         Mycoplasma Species by PCR    A negative result does not rule out the presence of PCR   inhibitors in the patient specimen or test-specific nucleic   acid in concentrations below the level of detection by this   test.     This test was developed and its performance characteristics   determined by [facility]. It has not been cleared or   approved by the [place] Food and Drug Administration. This test   was performed in a CLIA certified laboratory and is   intended for clinical purposes.  Performed by [facility],  [address] [place] [id number] [phone]  [email or web address] Shaen Kruse MD, PHD, Lab. Director   The longitudinal plan of care for the diagnosis(es)/condition(s) as documented were addressed during this visit. Due to the added complexity in care, I will continue to support Edgar in the subsequent management and with ongoing continuity of care.          Signed Electronically by: Eduardo Rucker MD

## 2024-08-29 DIAGNOSIS — A60.00 RECURRENT GENITAL HSV (HERPES SIMPLEX VIRUS) INFECTION: ICD-10-CM

## 2024-08-29 DIAGNOSIS — F90.0 ADHD (ATTENTION DEFICIT HYPERACTIVITY DISORDER), INATTENTIVE TYPE: ICD-10-CM

## 2024-08-30 RX ORDER — VALACYCLOVIR HYDROCHLORIDE 500 MG/1
500 TABLET, FILM COATED ORAL 2 TIMES DAILY PRN
Qty: 6 TABLET | Refills: 11 | Status: SHIPPED | OUTPATIENT
Start: 2024-08-30

## 2024-08-30 RX ORDER — METHYLPHENIDATE HYDROCHLORIDE 27 MG/1
27 TABLET ORAL EVERY MORNING
Qty: 30 TABLET | Refills: 0 | Status: SHIPPED | OUTPATIENT
Start: 2024-08-30 | End: 2024-09-27

## 2024-09-27 ENCOUNTER — OFFICE VISIT (OUTPATIENT)
Dept: FAMILY MEDICINE | Facility: CLINIC | Age: 29
End: 2024-09-27
Payer: COMMERCIAL

## 2024-09-27 VITALS
WEIGHT: 210.8 LBS | HEIGHT: 70 IN | HEART RATE: 58 BPM | SYSTOLIC BLOOD PRESSURE: 128 MMHG | RESPIRATION RATE: 14 BRPM | DIASTOLIC BLOOD PRESSURE: 60 MMHG | BODY MASS INDEX: 30.18 KG/M2 | TEMPERATURE: 97.7 F

## 2024-09-27 DIAGNOSIS — F90.0 ADHD (ATTENTION DEFICIT HYPERACTIVITY DISORDER), INATTENTIVE TYPE: ICD-10-CM

## 2024-09-27 PROCEDURE — 99213 OFFICE O/P EST LOW 20 MIN: CPT | Performed by: STUDENT IN AN ORGANIZED HEALTH CARE EDUCATION/TRAINING PROGRAM

## 2024-09-27 PROCEDURE — G2211 COMPLEX E/M VISIT ADD ON: HCPCS | Performed by: STUDENT IN AN ORGANIZED HEALTH CARE EDUCATION/TRAINING PROGRAM

## 2024-09-27 RX ORDER — METHYLPHENIDATE HYDROCHLORIDE 10 MG/1
10 TABLET ORAL
Qty: 30 TABLET | Refills: 0 | Status: SHIPPED | OUTPATIENT
Start: 2024-09-27

## 2024-09-27 RX ORDER — METHYLPHENIDATE HYDROCHLORIDE 27 MG/1
27 TABLET ORAL EVERY MORNING
Qty: 30 TABLET | Refills: 0 | Status: SHIPPED | OUTPATIENT
Start: 2024-09-27

## 2024-09-27 NOTE — PROGRESS NOTES
"  Assessment & Plan     ADHD (attention deficit hyperactivity disorder), inattentive type  Edgar is a 29-year-old female presenting today for treatment for ADHD.  She was diagnosed at [facility], see care everywhere for documentation.  They attempted Strattera, which helped somewhat but did not help enough.  We started Concerta 27 mg daily at her last visit on August 28, 2024.  She reports that she is doing well on the medication, it helps her to focus however does run out around 130 to 2:00 PM.  Started taking the medication a little bit later when she got to school to try to make the effect last longer but that has been hard because that she does not have the medication working when she gets to school.  I discussion about possible treatment options, we decided to still continue the Concerta 27 mg daily but add on Ritalin 10 mg short acting that she will take at lunch.      We discussed the side effects and complications that she could expect to include blood pressure issues, decreased appetite, insomnia, tics, and anxiety.  She reports understanding.  A substance use agreement was signed and put into their chart in August 2024.     She is to follow-up with me in 3 months, at that point we will check her blood pressure again, follow-up on effectiveness as well as side effects of the medication.  She will notify me prior to that if there are any issues we will adjust medication as needed. she reports understanding  - methylphenidate HCL ER, OSM, (CONCERTA) 27 MG CR tablet  Dispense: 30 tablet; Refill: 0  - methylphenidate (RITALIN) 10 MG tablet  Dispense: 30 tablet; Refill: 0            BMI  Estimated body mass index is 30.68 kg/m  as calculated from the following:    Height as of this encounter: 1.765 m (5' 9.5\").    Weight as of this encounter: 95.6 kg (210 lb 12.8 oz).             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  She like to see if there is anything she can do to get it to last a little bit longer.  She was taking it first thing in the morning and that was working well to give her a good effect by the time she got to school, however she started taking the medication later to try to make it last a little bit longer however has not been good for her first morning class and also still wearing off before her last class of the day.  Not have any problems with appetite, sleep, anxiety, feeling well generally.        Review of Systems  Constitutional, neuro, ENT, endocrine, pulmonary, cardiac, gastrointestinal, genitourinary, musculoskeletal, integument and psychiatric systems are negative, except as otherwise noted.      Objective    /60 (BP Location: Right arm, Patient Position: Sitting, Cuff Size: Adult Regular)   Temp 97.7  F (36.5  C) (Oral)   Resp 14   Ht 1.765 m (5' 9.5\")   Wt 95.6 kg (210 lb 12.8 oz)   BMI 30.68 kg/m    Body mass index is 30.68 kg/m .  Physical Exam   GENERAL: alert and no distress  CV: regular rate and rhythm, normal S1 S2, no S3 or S4, no murmur, click or rub, no peripheral edema  MS: no gross musculoskeletal defects noted, no edema  PSYCH: mentation appears normal, affect normal/bright        The longitudinal plan of care for the diagnosis(es)/condition(s) as documented were addressed during this visit. Due to the added complexity in care, I will continue to support Edgar in the subsequent management and with ongoing continuity of care.      Signed Electronically by: Eduardo Rucker MD    "

## 2024-10-31 ENCOUNTER — MYC REFILL (OUTPATIENT)
Dept: FAMILY MEDICINE | Facility: CLINIC | Age: 29
End: 2024-10-31
Payer: COMMERCIAL

## 2024-10-31 DIAGNOSIS — F90.0 ADHD (ATTENTION DEFICIT HYPERACTIVITY DISORDER), INATTENTIVE TYPE: ICD-10-CM

## 2024-11-01 RX ORDER — METHYLPHENIDATE HYDROCHLORIDE 27 MG/1
27 TABLET ORAL EVERY MORNING
Qty: 30 TABLET | Refills: 0 | Status: SHIPPED | OUTPATIENT
Start: 2024-11-01

## 2024-11-01 RX ORDER — METHYLPHENIDATE HYDROCHLORIDE 10 MG/1
10 TABLET ORAL
Qty: 30 TABLET | Refills: 0 | Status: SHIPPED | OUTPATIENT
Start: 2024-11-01

## 2024-11-27 ENCOUNTER — MYC REFILL (OUTPATIENT)
Dept: FAMILY MEDICINE | Facility: CLINIC | Age: 29
End: 2024-11-27
Payer: COMMERCIAL

## 2024-11-27 DIAGNOSIS — F90.0 ADHD (ATTENTION DEFICIT HYPERACTIVITY DISORDER), INATTENTIVE TYPE: ICD-10-CM

## 2024-11-27 DIAGNOSIS — A60.00 RECURRENT GENITAL HSV (HERPES SIMPLEX VIRUS) INFECTION: ICD-10-CM

## 2024-11-27 RX ORDER — METHYLPHENIDATE HYDROCHLORIDE 10 MG/1
10 TABLET ORAL
Qty: 30 TABLET | Refills: 0 | Status: SHIPPED | OUTPATIENT
Start: 2024-11-27

## 2024-11-27 RX ORDER — METHYLPHENIDATE HYDROCHLORIDE 27 MG/1
27 TABLET ORAL EVERY MORNING
Qty: 30 TABLET | Refills: 0 | Status: SHIPPED | OUTPATIENT
Start: 2024-11-27

## 2024-11-27 RX ORDER — VALACYCLOVIR HYDROCHLORIDE 500 MG/1
500 TABLET, FILM COATED ORAL 2 TIMES DAILY PRN
Qty: 6 TABLET | Refills: 11 | OUTPATIENT
Start: 2024-11-27

## 2024-12-18 ENCOUNTER — PATIENT OUTREACH (OUTPATIENT)
Dept: CARE COORDINATION | Facility: CLINIC | Age: 29
End: 2024-12-18
Payer: COMMERCIAL

## 2025-01-01 ENCOUNTER — PATIENT OUTREACH (OUTPATIENT)
Dept: CARE COORDINATION | Facility: CLINIC | Age: 30
End: 2025-01-01
Payer: COMMERCIAL

## 2025-01-07 ENCOUNTER — OFFICE VISIT (OUTPATIENT)
Dept: FAMILY MEDICINE | Facility: CLINIC | Age: 30
End: 2025-01-07
Payer: COMMERCIAL

## 2025-01-07 VITALS
BODY MASS INDEX: 30.29 KG/M2 | HEIGHT: 70 IN | RESPIRATION RATE: 16 BRPM | OXYGEN SATURATION: 99 % | HEART RATE: 64 BPM | SYSTOLIC BLOOD PRESSURE: 122 MMHG | TEMPERATURE: 97.4 F | WEIGHT: 211.6 LBS | DIASTOLIC BLOOD PRESSURE: 80 MMHG

## 2025-01-07 DIAGNOSIS — F90.0 ADHD (ATTENTION DEFICIT HYPERACTIVITY DISORDER), INATTENTIVE TYPE: Primary | ICD-10-CM

## 2025-01-07 PROCEDURE — 99213 OFFICE O/P EST LOW 20 MIN: CPT | Performed by: STUDENT IN AN ORGANIZED HEALTH CARE EDUCATION/TRAINING PROGRAM

## 2025-01-07 PROCEDURE — G2211 COMPLEX E/M VISIT ADD ON: HCPCS | Performed by: STUDENT IN AN ORGANIZED HEALTH CARE EDUCATION/TRAINING PROGRAM

## 2025-01-07 RX ORDER — METHYLPHENIDATE HYDROCHLORIDE 36 MG/1
36 TABLET ORAL DAILY
Qty: 30 TABLET | Refills: 0 | Status: SHIPPED | OUTPATIENT
Start: 2025-01-07 | End: 2025-02-06

## 2025-01-07 RX ORDER — METHYLPHENIDATE HYDROCHLORIDE 10 MG/1
10 TABLET ORAL 2 TIMES DAILY
Qty: 60 TABLET | Refills: 0 | Status: SHIPPED | OUTPATIENT
Start: 2025-03-10 | End: 2025-04-09

## 2025-01-07 RX ORDER — METHYLPHENIDATE HYDROCHLORIDE 36 MG/1
36 TABLET ORAL DAILY
Qty: 30 TABLET | Refills: 0 | Status: SHIPPED | OUTPATIENT
Start: 2025-03-08 | End: 2025-04-07

## 2025-01-07 RX ORDER — METHYLPHENIDATE HYDROCHLORIDE 10 MG/1
10 TABLET ORAL 2 TIMES DAILY
Qty: 60 TABLET | Refills: 0 | Status: SHIPPED | OUTPATIENT
Start: 2025-02-07 | End: 2025-03-09

## 2025-01-07 RX ORDER — METHYLPHENIDATE HYDROCHLORIDE 36 MG/1
36 TABLET ORAL DAILY
Qty: 30 TABLET | Refills: 0 | Status: SHIPPED | OUTPATIENT
Start: 2025-02-06 | End: 2025-03-08

## 2025-01-07 RX ORDER — METHYLPHENIDATE HYDROCHLORIDE 10 MG/1
10 TABLET ORAL 2 TIMES DAILY
Qty: 60 TABLET | Refills: 0 | Status: SHIPPED | OUTPATIENT
Start: 2025-01-07 | End: 2025-02-06

## 2025-01-07 NOTE — PROGRESS NOTES
Assessment & Plan     ADHD (attention deficit hyperactivity disorder), inattentive type  Edgar is a 29-year-old female presenting today for follow-up for ADHD.      She was diagnosed at [facility], see care everywhere for documentation.  They attempted Strattera, which helped somewhat but did not help enough.      A substance use agreement was signed and put into their chart in August 2024.    We started Concerta 27 mg daily in August 2024.  We added Ritalin 10 mg short acting in September 2024, she reports that she is not getting the effects she wants from the medication at this dose, and in addition it runs out around 1:30 PM.  There have not been any side effects.    We had a discussion about possible treatment options, we decided to still continue the Concerta but at increased dose, 36 mg daily, and to continue Ritalin 10 mg short acting that she will take at lunch.    We discussed the side effects and complications that she could expect to include blood pressure issues, decreased appetite, insomnia, tics, and anxiety.  She reports understanding.       She is to follow-up with me in 1-3 months, at that point we will check her blood pressure again, follow-up on effectiveness as well as side effects of the medication.  She will notify me prior to that if there are any issues we will adjust medication as needed. she reports understanding    - methylphenidate HCl ER, OSM, (CONCERTA) 36 MG CR tablet  Dispense: 30 tablet; Refill: 0  - methylphenidate HCl ER, OSM, (CONCERTA) 36 MG CR tablet  Dispense: 30 tablet; Refill: 0  - methylphenidate HCl ER, OSM, (CONCERTA) 36 MG CR tablet  Dispense: 30 tablet; Refill: 0  - methylphenidate (RITALIN) 10 MG tablet  Dispense: 60 tablet; Refill: 0  - methylphenidate (RITALIN) 10 MG tablet  Dispense: 60 tablet; Refill: 0  - methylphenidate (RITALIN) 10 MG tablet  Dispense: 60 tablet; Refill: 0            BMI  Estimated body mass index is 30.8 kg/m  as calculated from  "the following:    Height as of this encounter: 1.765 m (5' 9.5\").    Weight as of this encounter: 96 kg (211 lb 9.6 oz).             Israel Hernández is a 29 year old, presenting for the following health issues:  Medication Request (Discuss increasing medication dosage)        1/7/2025     2:53 PM   Additional Questions   Roomed by DALJIT OSUNA     History of Present Illness       Reason for visit:  Possible medication change    She eats 2-3 servings of fruits and vegetables daily.She consumes 1 sweetened beverage(s) daily.She exercises with enough effort to increase her heart rate 10 to 19 minutes per day.  She exercises with enough effort to increase her heart rate 4 days per week.   She is taking medications regularly.       Has taken the ritalin 10 mg with the concerta 27 mg together in the morning and this seems to work, but then it will wear off in the afternoon.      When she takes it during the school days she has not been feeling like it is working very well.  Not having as much effect on focus and attention.  Not have any side effects however, there is no difficulty with falling asleep, noted appetite suppression, no palpitations or shortness of breath.  No tics.              Review of Systems  Constitutional, neuro, ENT, endocrine, pulmonary, cardiac, gastrointestinal, genitourinary, musculoskeletal, integument and psychiatric systems are negative, except as otherwise noted.      Objective    /80 (BP Location: Right arm, Patient Position: Sitting, Cuff Size: Adult Regular)   Pulse 64   Temp 97.4  F (36.3  C)   Resp 16   Ht 1.765 m (5' 9.5\")   Wt 96 kg (211 lb 9.6 oz)   SpO2 99%   BMI 30.80 kg/m    Body mass index is 30.8 kg/m .  Physical Exam   GENERAL: alert and no distress  EYES: Eyes grossly normal to inspection, PERRL and conjunctivae and sclerae normal  MS: no gross musculoskeletal defects noted, no edema  SKIN: no suspicious lesions or rashes  NEURO: Normal strength and tone, mentation " intact and speech normal  PSYCH: mentation appears normal, affect normal/bright        The longitudinal plan of care for the diagnosis(es)/condition(s) as documented were addressed during this visit. Due to the added complexity in care, I will continue to support Edgar in the subsequent management and with ongoing continuity of care.      Signed Electronically by: Eduardo Rucker MD

## 2025-02-22 ENCOUNTER — HEALTH MAINTENANCE LETTER (OUTPATIENT)
Age: 30
End: 2025-02-22

## 2025-04-01 ENCOUNTER — E-VISIT (OUTPATIENT)
Dept: FAMILY MEDICINE | Facility: CLINIC | Age: 30
End: 2025-04-01
Payer: COMMERCIAL

## 2025-04-01 DIAGNOSIS — F90.0 ADHD (ATTENTION DEFICIT HYPERACTIVITY DISORDER), INATTENTIVE TYPE: Primary | ICD-10-CM

## 2025-04-01 RX ORDER — METHYLPHENIDATE HYDROCHLORIDE 10 MG/1
10 TABLET ORAL DAILY
Qty: 30 TABLET | Refills: 0 | Status: SHIPPED | OUTPATIENT
Start: 2025-04-01 | End: 2025-05-01

## 2025-04-01 RX ORDER — DEXTROAMPHETAMINE SACCHARATE, AMPHETAMINE ASPARTATE, DEXTROAMPHETAMINE SULFATE AND AMPHETAMINE SULFATE 2.5; 2.5; 2.5; 2.5 MG/1; MG/1; MG/1; MG/1
10 TABLET ORAL DAILY
Qty: 30 TABLET | Refills: 0 | Status: SHIPPED | OUTPATIENT
Start: 2025-06-02 | End: 2025-04-01

## 2025-04-01 RX ORDER — METHYLPHENIDATE HYDROCHLORIDE 36 MG/1
36 TABLET ORAL DAILY
Qty: 30 TABLET | Refills: 0 | Status: SHIPPED | OUTPATIENT
Start: 2025-06-02 | End: 2025-07-02

## 2025-04-01 RX ORDER — DEXTROAMPHETAMINE SACCHARATE, AMPHETAMINE ASPARTATE, DEXTROAMPHETAMINE SULFATE AND AMPHETAMINE SULFATE 2.5; 2.5; 2.5; 2.5 MG/1; MG/1; MG/1; MG/1
10 TABLET ORAL DAILY
Qty: 30 TABLET | Refills: 0 | Status: SHIPPED | OUTPATIENT
Start: 2025-05-02 | End: 2025-04-01

## 2025-04-01 RX ORDER — METHYLPHENIDATE HYDROCHLORIDE 36 MG/1
36 TABLET ORAL DAILY
Qty: 30 TABLET | Refills: 0 | Status: SHIPPED | OUTPATIENT
Start: 2025-05-02 | End: 2025-06-01

## 2025-04-01 RX ORDER — DEXTROAMPHETAMINE SACCHARATE, AMPHETAMINE ASPARTATE, DEXTROAMPHETAMINE SULFATE AND AMPHETAMINE SULFATE 2.5; 2.5; 2.5; 2.5 MG/1; MG/1; MG/1; MG/1
10 TABLET ORAL DAILY
Qty: 30 TABLET | Refills: 0 | Status: SHIPPED | OUTPATIENT
Start: 2025-04-01 | End: 2025-04-01

## 2025-04-01 RX ORDER — METHYLPHENIDATE HYDROCHLORIDE 10 MG/1
10 TABLET ORAL DAILY
Qty: 30 TABLET | Refills: 0 | Status: SHIPPED | OUTPATIENT
Start: 2025-05-31 | End: 2025-06-30

## 2025-04-01 RX ORDER — METHYLPHENIDATE HYDROCHLORIDE 10 MG/1
10 TABLET ORAL DAILY
Qty: 30 TABLET | Refills: 0 | Status: SHIPPED | OUTPATIENT
Start: 2025-05-01 | End: 2025-05-31

## 2025-04-01 RX ORDER — METHYLPHENIDATE HYDROCHLORIDE 36 MG/1
36 TABLET ORAL DAILY
Qty: 30 TABLET | Refills: 0 | Status: SHIPPED | OUTPATIENT
Start: 2025-04-01 | End: 2025-05-01

## 2025-04-01 ASSESSMENT — ANXIETY QUESTIONNAIRES
IF YOU CHECKED OFF ANY PROBLEMS ON THIS QUESTIONNAIRE, HOW DIFFICULT HAVE THESE PROBLEMS MADE IT FOR YOU TO DO YOUR WORK, TAKE CARE OF THINGS AT HOME, OR GET ALONG WITH OTHER PEOPLE: NOT DIFFICULT AT ALL
8. IF YOU CHECKED OFF ANY PROBLEMS, HOW DIFFICULT HAVE THESE MADE IT FOR YOU TO DO YOUR WORK, TAKE CARE OF THINGS AT HOME, OR GET ALONG WITH OTHER PEOPLE?: NOT DIFFICULT AT ALL
1. FEELING NERVOUS, ANXIOUS, OR ON EDGE: NOT AT ALL
5. BEING SO RESTLESS THAT IT IS HARD TO SIT STILL: NOT AT ALL
6. BECOMING EASILY ANNOYED OR IRRITABLE: NOT AT ALL
GAD7 TOTAL SCORE: 0
4. TROUBLE RELAXING: NOT AT ALL
3. WORRYING TOO MUCH ABOUT DIFFERENT THINGS: NOT AT ALL
7. FEELING AFRAID AS IF SOMETHING AWFUL MIGHT HAPPEN: NOT AT ALL
GAD7 TOTAL SCORE: 0
7. FEELING AFRAID AS IF SOMETHING AWFUL MIGHT HAPPEN: NOT AT ALL
GAD7 TOTAL SCORE: 0
2. NOT BEING ABLE TO STOP OR CONTROL WORRYING: NOT AT ALL

## 2025-04-01 ASSESSMENT — PATIENT HEALTH QUESTIONNAIRE - PHQ9
SUM OF ALL RESPONSES TO PHQ QUESTIONS 1-9: 1
10. IF YOU CHECKED OFF ANY PROBLEMS, HOW DIFFICULT HAVE THESE PROBLEMS MADE IT FOR YOU TO DO YOUR WORK, TAKE CARE OF THINGS AT HOME, OR GET ALONG WITH OTHER PEOPLE: NOT DIFFICULT AT ALL
SUM OF ALL RESPONSES TO PHQ QUESTIONS 1-9: 1

## 2025-04-01 NOTE — PATIENT INSTRUCTIONS
I am glad you are doing well. I have refilled your medication:  Orders Placed This Encounter   Medications     methylphenidate HCl ER, OSM, (CONCERTA) 36 MG CR tablet     Sig: Take 1 tablet (36 mg) by mouth daily.     Dispense:  30 tablet     Refill:  0     methylphenidate HCl ER, OSM, (CONCERTA) 36 MG CR tablet     Sig: Take 1 tablet (36 mg) by mouth daily.     Dispense:  30 tablet     Refill:  0     methylphenidate HCl ER, OSM, (CONCERTA) 36 MG CR tablet     Sig: Take 1 tablet (36 mg) by mouth daily.     Dispense:  30 tablet     Refill:  0     amphetamine-dextroamphetamine (ADDERALL) 10 MG tablet     Sig: Take 1 tablet (10 mg) by mouth daily.     Dispense:  30 tablet     Refill:  0     amphetamine-dextroamphetamine (ADDERALL) 10 MG tablet     Sig: Take 1 tablet (10 mg) by mouth daily.     Dispense:  30 tablet     Refill:  0     amphetamine-dextroamphetamine (ADDERALL) 10 MG tablet     Sig: Take 1 tablet (10 mg) by mouth daily.     Dispense:  30 tablet     Refill:  0        It is refilled for 3 months.  In the [facility], I can only give 30-day prescriptions at a time for stimulant medications, but these are refilled every month, so you should be able to get the medication at the pharmacy for the next 3 months.    Follow-up with me with an e-visit in 3 months for ADHD medication refills if things are going well.  If things are not going well please let me know and we will get you in sooner.    View your full visit summary for details by clicking on the link below. Your pharmacist will be able to address any questions you may have about the medication.      Thank you for choosing us for your care.

## 2025-05-04 ENCOUNTER — E-VISIT (OUTPATIENT)
Dept: FAMILY MEDICINE | Facility: CLINIC | Age: 30
End: 2025-05-04
Payer: COMMERCIAL

## 2025-05-04 DIAGNOSIS — F90.0 ATTENTION DEFICIT HYPERACTIVITY DISORDER (ADHD), PREDOMINANTLY INATTENTIVE TYPE: Primary | ICD-10-CM

## 2025-05-04 PROCEDURE — 99207 PR NON-BILLABLE SERV PER CHARTING: CPT | Performed by: STUDENT IN AN ORGANIZED HEALTH CARE EDUCATION/TRAINING PROGRAM

## 2025-05-04 ASSESSMENT — ANXIETY QUESTIONNAIRES
GAD7 TOTAL SCORE: 0
4. TROUBLE RELAXING: NOT AT ALL
6. BECOMING EASILY ANNOYED OR IRRITABLE: NOT AT ALL
2. NOT BEING ABLE TO STOP OR CONTROL WORRYING: NOT AT ALL
3. WORRYING TOO MUCH ABOUT DIFFERENT THINGS: NOT AT ALL
8. IF YOU CHECKED OFF ANY PROBLEMS, HOW DIFFICULT HAVE THESE MADE IT FOR YOU TO DO YOUR WORK, TAKE CARE OF THINGS AT HOME, OR GET ALONG WITH OTHER PEOPLE?: NOT DIFFICULT AT ALL
5. BEING SO RESTLESS THAT IT IS HARD TO SIT STILL: NOT AT ALL
1. FEELING NERVOUS, ANXIOUS, OR ON EDGE: NOT AT ALL
GAD7 TOTAL SCORE: 0
GAD7 TOTAL SCORE: 0
7. FEELING AFRAID AS IF SOMETHING AWFUL MIGHT HAPPEN: NOT AT ALL
7. FEELING AFRAID AS IF SOMETHING AWFUL MIGHT HAPPEN: NOT AT ALL
IF YOU CHECKED OFF ANY PROBLEMS ON THIS QUESTIONNAIRE, HOW DIFFICULT HAVE THESE PROBLEMS MADE IT FOR YOU TO DO YOUR WORK, TAKE CARE OF THINGS AT HOME, OR GET ALONG WITH OTHER PEOPLE: NOT DIFFICULT AT ALL

## 2025-05-04 ASSESSMENT — PATIENT HEALTH QUESTIONNAIRE - PHQ9
SUM OF ALL RESPONSES TO PHQ QUESTIONS 1-9: 0
SUM OF ALL RESPONSES TO PHQ QUESTIONS 1-9: 0
10. IF YOU CHECKED OFF ANY PROBLEMS, HOW DIFFICULT HAVE THESE PROBLEMS MADE IT FOR YOU TO DO YOUR WORK, TAKE CARE OF THINGS AT HOME, OR GET ALONG WITH OTHER PEOPLE: NOT DIFFICULT AT ALL

## 2025-05-05 NOTE — PATIENT INSTRUCTIONS
Edgar, it looks like you already have medication refills, is there something that you need help with with the medication?      View your full visit summary for details by clicking on the link below. Your pharmacist will be able to address any questions you may have about the medication.      Thank you for choosing us for your care.

## 2025-05-05 NOTE — TELEPHONE ENCOUNTER
Provider E-Visit time total (minutes):  Less than 5 minutes.       Please notify Edgar that she already has prescriptions through early July, so she should be able to pick them up.  Thank you.

## 2025-05-05 NOTE — TELEPHONE ENCOUNTER
Provider E-Visit time total (minutes):           I believe I was told to schedule an appointment since I did not have one in April. Can i wait until July before I have my next follow up??      Also, How can i call to refill my prescription? I know that you can not prescribe a stimulant more than 1 month in advance but i am not sure how to call in my meds for May, as the bottle says no refills. Can you help me?          Yes you can wait until July for follow-up.  You just call the pharmacy for your prescription when it is time.  I have dated the prescription for 1 month from now.  It is technically a new prescription, not a refill.

## 2025-06-06 DIAGNOSIS — A60.00 RECURRENT GENITAL HSV (HERPES SIMPLEX VIRUS) INFECTION: ICD-10-CM

## 2025-06-09 RX ORDER — VALACYCLOVIR HYDROCHLORIDE 500 MG/1
500 TABLET, FILM COATED ORAL DAILY
Qty: 30 TABLET | OUTPATIENT
Start: 2025-06-09

## 2025-06-09 NOTE — TELEPHONE ENCOUNTER
Requested Prescriptions   Pending Prescriptions Disp Refills    valACYclovir (VALTREX) 500 MG tablet [Pharmacy Med Name: VALACYCLOVIR 500MG TABLETS] 30 tablet      Sig: TAKE 1 TABLET(500 MG) BY MOUTH DAILY       Antivirals Failed - 6/9/2025  9:27 AM        Failed - Medication is active on med list and the sig matches. RN to manually verify dose and sig if red X/fail.     If the protocol passes (green check), you do not need to verify med dose and sig.    A prescription matches if they are the same clinical intention.    For Example: once daily and every morning are the same.    The protocol can not identify upper and lower case letters as matching and will fail.     For Example: Take 1 tablet (50 mg) by mouth daily     TAKE 1 TABLET (50 MG) BY MOUTH DAILY    For all fails (red x), verify dose and sig.    If the refill does match what is on file, the RN can still proceed to approve the refill request.       If they do not match, route to the appropriate provider.             Failed - Recent (12 month) or future (90 days) visit with authorizing provider's specialty (provided they have been seen in the past 15 months)     The patient must have completed an in-person or virtual visit within the past 12 months or has a future visit scheduled within the next 90 days with the authorizing provider s specialty.  Urgent care and e-visits do not qualify as an office visit for this protocol.          Passed - Patient is age 12 or older        Passed - Medication indicated for associated diagnosis     Medication is associated with one or more of the following diagnoses:     Genital herpes simplex   Herpes simples   Herpes zoster, shingles   Varicella   Recurrent herpes simplex labialis   HIV infection   Varicella-zoster virus infection, prophylaxis             Last Written Prescription Date:  2/21/25  Last Fill Quantity: 6,  # refills: 11   Last office visit: 8/9/22; last virtual visit: Visit date not found with prescribing  provider:  Eduardo Rucker MD   Future Office Visit:  none    Pt has not been seen in clinic since 2022    Med last Rx'd by outside provider Dr. Rucker - should have refills available    Audrey Rojas RN on 6/9/2025 at 9:29 AM  [facility] Triage

## 2025-07-16 ENCOUNTER — PATIENT OUTREACH (OUTPATIENT)
Dept: CARE COORDINATION | Facility: CLINIC | Age: 30
End: 2025-07-16
Payer: COMMERCIAL

## 2025-08-03 ENCOUNTER — MYC REFILL (OUTPATIENT)
Dept: FAMILY MEDICINE | Facility: CLINIC | Age: 30
End: 2025-08-03
Payer: COMMERCIAL

## 2025-08-03 DIAGNOSIS — F90.0 ADHD (ATTENTION DEFICIT HYPERACTIVITY DISORDER), INATTENTIVE TYPE: ICD-10-CM

## 2025-08-04 RX ORDER — METHYLPHENIDATE HYDROCHLORIDE 36 MG/1
36 TABLET ORAL DAILY
Qty: 30 TABLET | Refills: 0 | Status: SHIPPED | OUTPATIENT
Start: 2025-08-04

## 2025-08-07 ENCOUNTER — MYC MEDICAL ADVICE (OUTPATIENT)
Dept: FAMILY MEDICINE | Facility: CLINIC | Age: 30
End: 2025-08-07
Payer: COMMERCIAL

## 2025-08-07 DIAGNOSIS — F90.0 ATTENTION DEFICIT HYPERACTIVITY DISORDER (ADHD), PREDOMINANTLY INATTENTIVE TYPE: Primary | ICD-10-CM

## 2025-08-13 RX ORDER — METHYLPHENIDATE HYDROCHLORIDE 54 MG/1
54 TABLET ORAL EVERY MORNING
Qty: 30 TABLET | Refills: 0 | Status: SHIPPED | OUTPATIENT
Start: 2025-08-13

## 2025-09-03 ENCOUNTER — MYC REFILL (OUTPATIENT)
Dept: FAMILY MEDICINE | Facility: CLINIC | Age: 30
End: 2025-09-03
Payer: COMMERCIAL

## 2025-09-03 DIAGNOSIS — F90.0 ATTENTION DEFICIT HYPERACTIVITY DISORDER (ADHD), PREDOMINANTLY INATTENTIVE TYPE: ICD-10-CM

## 2025-09-03 DIAGNOSIS — F90.0 ADHD (ATTENTION DEFICIT HYPERACTIVITY DISORDER), INATTENTIVE TYPE: ICD-10-CM

## 2025-09-03 RX ORDER — METHYLPHENIDATE HYDROCHLORIDE 54 MG/1
54 TABLET ORAL EVERY MORNING
Qty: 30 TABLET | Refills: 0 | Status: SHIPPED | OUTPATIENT
Start: 2025-09-03 | End: 2025-09-03

## 2025-09-03 RX ORDER — METHYLPHENIDATE HYDROCHLORIDE 54 MG/1
54 TABLET ORAL EVERY MORNING
Qty: 30 TABLET | Refills: 0 | Status: SHIPPED | OUTPATIENT
Start: 2025-09-12

## 2025-09-03 RX ORDER — METHYLPHENIDATE HYDROCHLORIDE 10 MG/1
10 TABLET ORAL DAILY
Qty: 30 TABLET | Refills: 0 | OUTPATIENT
Start: 2025-09-03